# Patient Record
Sex: FEMALE | Employment: FULL TIME | URBAN - METROPOLITAN AREA
[De-identification: names, ages, dates, MRNs, and addresses within clinical notes are randomized per-mention and may not be internally consistent; named-entity substitution may affect disease eponyms.]

---

## 2017-04-27 ENCOUNTER — LAB ENCOUNTER (OUTPATIENT)
Dept: LAB | Facility: HOSPITAL | Age: 52
End: 2017-04-27
Attending: INTERNAL MEDICINE
Payer: COMMERCIAL

## 2017-04-27 DIAGNOSIS — B19.20 HEPATITIS C VIRUS INFECTION WITHOUT HEPATIC COMA, UNSPECIFIED CHRONICITY: ICD-10-CM

## 2017-04-27 PROCEDURE — 36415 COLL VENOUS BLD VENIPUNCTURE: CPT

## 2017-04-27 PROCEDURE — 85025 COMPLETE CBC W/AUTO DIFF WBC: CPT

## 2017-04-28 NOTE — PROGRESS NOTES
Quick Note:    Reviewed result. For some reason lab didn't run the CMP or HCV RNA quant - pt will have this drawn in a week or so. OV set for 5/24/17.   Pt verbalized understanding  ______

## 2017-05-04 ENCOUNTER — APPOINTMENT (OUTPATIENT)
Dept: LAB | Facility: HOSPITAL | Age: 52
End: 2017-05-04
Attending: INTERNAL MEDICINE
Payer: COMMERCIAL

## 2017-05-04 DIAGNOSIS — B19.20 HEPATITIS C VIRUS INFECTION WITHOUT HEPATIC COMA, UNSPECIFIED CHRONICITY: ICD-10-CM

## 2017-05-04 PROCEDURE — 80053 COMPREHEN METABOLIC PANEL: CPT

## 2017-05-04 PROCEDURE — 87522 HEPATITIS C REVRS TRNSCRPJ: CPT

## 2017-05-04 PROCEDURE — 36415 COLL VENOUS BLD VENIPUNCTURE: CPT

## 2017-05-10 NOTE — PROGRESS NOTES
Quick Note:    Pt instructed to call office back at [phone], press option #2 for a nurse to discuss results.   OV 5/24/17  ______

## 2017-05-11 NOTE — PROGRESS NOTES
Quick Note:    Left message to call office @ [phone] option #2, triage RN for test result.  ______

## 2017-05-25 ENCOUNTER — TELEPHONE (OUTPATIENT)
Dept: OBGYN CLINIC | Facility: CLINIC | Age: 52
End: 2017-05-25

## 2017-05-25 DIAGNOSIS — Z85.3 HX OF BREAST CANCER: Primary | ICD-10-CM

## 2017-06-01 ENCOUNTER — HOSPITAL ENCOUNTER (OUTPATIENT)
Dept: MAMMOGRAPHY | Facility: HOSPITAL | Age: 52
Discharge: HOME | End: 2017-06-01
Attending: OBSTETRICS & GYNECOLOGY
Payer: COMMERCIAL

## 2017-06-01 DIAGNOSIS — Z85.3 HX OF BREAST CANCER: ICD-10-CM

## 2017-06-01 PROCEDURE — 77066 DX MAMMO INCL CAD BI: CPT | Performed by: OBSTETRICS & GYNECOLOGY

## 2017-07-24 RX ORDER — TAMOXIFEN CITRATE 20 MG/1
TABLET ORAL
Qty: 90 TABLET | Refills: 3 | Status: SHIPPED | OUTPATIENT
Start: 2017-07-24 | End: 2017-07-24

## 2017-08-30 ENCOUNTER — OFFICE VISIT (OUTPATIENT)
Dept: HEMATOLOGY/ONCOLOGY | Facility: HOSPITAL | Age: 52
End: 2017-08-30
Attending: INTERNAL MEDICINE
Payer: COMMERCIAL

## 2017-08-30 VITALS
SYSTOLIC BLOOD PRESSURE: 131 MMHG | HEART RATE: 83 BPM | TEMPERATURE: 99 F | RESPIRATION RATE: 16 BRPM | DIASTOLIC BLOOD PRESSURE: 74 MMHG | HEIGHT: 64 IN

## 2017-08-30 DIAGNOSIS — F41.9 ANXIETY: ICD-10-CM

## 2017-08-30 DIAGNOSIS — C50.512 MALIGNANT NEOPLASM OF LOWER-OUTER QUADRANT OF LEFT BREAST OF FEMALE, ESTROGEN RECEPTOR POSITIVE (HCC): Primary | ICD-10-CM

## 2017-08-30 DIAGNOSIS — Z79.810 ENCOUNTER FOR MONITORING TAMOXIFEN THERAPY: ICD-10-CM

## 2017-08-30 DIAGNOSIS — Z51.81 ENCOUNTER FOR MONITORING TAMOXIFEN THERAPY: ICD-10-CM

## 2017-08-30 DIAGNOSIS — N95.1 MENOPAUSAL SYMPTOMS: ICD-10-CM

## 2017-08-30 DIAGNOSIS — Z17.0 MALIGNANT NEOPLASM OF LOWER-OUTER QUADRANT OF LEFT BREAST OF FEMALE, ESTROGEN RECEPTOR POSITIVE (HCC): Primary | ICD-10-CM

## 2017-08-30 PROCEDURE — 99214 OFFICE O/P EST MOD 30 MIN: CPT | Performed by: INTERNAL MEDICINE

## 2017-08-30 PROCEDURE — 99212 OFFICE O/P EST SF 10 MIN: CPT | Performed by: INTERNAL MEDICINE

## 2017-08-30 NOTE — PROGRESS NOTES
FALGUNI Ch is a 44year old female with a history of triple positive, pT1c Nmi M0 infiltrating ductal carcinoma of the left breast.  Her mammogram performed 6/1/2017 was benign. Patient has continued to take tamoxifen daily.   She does not ha 9/21/2012 Surgery PORTACATH REMOVED BY DR. Davina La          Review of Systems:     Review of Systems   Constitutional: Positive for fatigue. Negative for activity change, chills and fever.         Refuses weight - upset with weight gain   HENT: Positive for 2010:  lumpectomy    • Malignant neoplasm of lower-outer quadrant of left breast of female, estrogen receptor positive ([facility] [address] 1/13/2012   • Osteoporosis screening 4/25/2014    DEXA SCAN   • Ruptured appendix 1995    per NG: appendectomy     Past Surgical H Constitutional: She is oriented to person, place, and time. She appears well-developed. obese   HENT:   Head: Normocephalic and atraumatic. Mouth/Throat: No oropharyngeal exudate.    Eyes: Conjunctivae and EOM are normal. Pupils are equal, round, and re Patient is at risk for osteoporosis. She had a DEXA scan April 18, 2016. Will repeat the DEXA scan in 2 years.      Patient is encouraged to again lose weight with a healthy diet and increased exercise program.    Patient has been successfully treated for If there is no palpable abnormality, routine yearly followup is recommended.                            Dictated by (CST): Michelle Tanner MD on 6/01/2017 at 13:29       Approved by (CST): Michelle Griffin MD on 6/01/2017 at 13:3 0.0 - 1.0 K/UL  0.6   Eosinophils Absolute      0.0 - 0.7 K/UL  0.1   Basophils Absolute      0.0 - 0.2 K/UL  0.0   HCV RNA Qnt Real-Time PCR Log IU/ml      log IU <1.2    HCV Rna Qnt Real-Time PCR IU/ml      IU/mL <15    HCV RNA QNT PCR INTERPRETATION probability of a major osteoporosis-related fracture > 20% based on the  US-adapted WHO algorithm     CONCLUSION: Normal bone density. 3% decrease in bone density of left hip  since previous study. No significant change in bone density of lumbar  spine.

## 2017-11-10 ENCOUNTER — APPOINTMENT (OUTPATIENT)
Dept: LAB | Facility: HOSPITAL | Age: 52
End: 2017-11-10
Attending: INTERNAL MEDICINE
Payer: COMMERCIAL

## 2017-11-10 DIAGNOSIS — C50.512 MALIGNANT NEOPLASM OF LOWER-OUTER QUADRANT OF LEFT BREAST OF FEMALE, ESTROGEN RECEPTOR POSITIVE (HCC): ICD-10-CM

## 2017-11-10 DIAGNOSIS — Z51.81 ENCOUNTER FOR MONITORING TAMOXIFEN THERAPY: ICD-10-CM

## 2017-11-10 DIAGNOSIS — Z17.0 MALIGNANT NEOPLASM OF LOWER-OUTER QUADRANT OF LEFT BREAST OF FEMALE, ESTROGEN RECEPTOR POSITIVE (HCC): ICD-10-CM

## 2017-11-10 DIAGNOSIS — Z79.810 ENCOUNTER FOR MONITORING TAMOXIFEN THERAPY: ICD-10-CM

## 2017-11-10 DIAGNOSIS — N95.1 MENOPAUSAL SYMPTOMS: ICD-10-CM

## 2017-11-10 PROCEDURE — 83001 ASSAY OF GONADOTROPIN (FSH): CPT

## 2017-11-10 PROCEDURE — 82670 ASSAY OF TOTAL ESTRADIOL: CPT

## 2017-12-26 ENCOUNTER — APPOINTMENT (OUTPATIENT)
Dept: GENERAL RADIOLOGY | Age: 52
End: 2017-12-26
Attending: NURSE PRACTITIONER
Payer: COMMERCIAL

## 2017-12-26 ENCOUNTER — HOSPITAL ENCOUNTER (OUTPATIENT)
Age: 52
Discharge: HOME | End: 2017-12-26
Payer: COMMERCIAL

## 2017-12-26 VITALS
TEMPERATURE: 98 F | SYSTOLIC BLOOD PRESSURE: 127 MMHG | HEART RATE: 90 BPM | RESPIRATION RATE: 18 BRPM | HEIGHT: 64 IN | BODY MASS INDEX: 39.27 KG/M2 | DIASTOLIC BLOOD PRESSURE: 84 MMHG | WEIGHT: 230 LBS | OXYGEN SATURATION: 99 %

## 2017-12-26 DIAGNOSIS — M79.89 FINGER SWELLING: Primary | ICD-10-CM

## 2017-12-26 PROCEDURE — 73140 X-RAY EXAM OF FINGER(S): CPT | Performed by: NURSE PRACTITIONER

## 2017-12-26 PROCEDURE — 99213 OFFICE O/P EST LOW 20 MIN: CPT

## 2017-12-26 PROCEDURE — 99203 OFFICE O/P NEW LOW 30 MIN: CPT

## 2017-12-26 NOTE — ED PROVIDER NOTES
Patient presents with:  Upper Extremity Injury (musculoskeletal)      HPI:     Tres Smiley is a 45year old female who presents today with a chief complaint of swelling in the second digit of the L hand over the course of the last one month.  Pt rachel Comments:              swollen finger            Order Specific Question: What is the Relevant Clinical Indication / Reason for Exam?          Answer: pain/injury    Labs performed this visit:  No results found for this or any previous visit (from the past

## 2018-04-09 ENCOUNTER — TELEPHONE (OUTPATIENT)
Dept: HEMATOLOGY/ONCOLOGY | Facility: HOSPITAL | Age: 53
End: 2018-04-09

## 2018-04-09 NOTE — TELEPHONE ENCOUNTER
Vishal Carrion called and said she took some lab test in November that would determine if she was to stay on the Tamoxifen or not, she said she never heard back with the result, please call her at work today [phone].  Thanks

## 2018-04-10 ENCOUNTER — OFFICE VISIT (OUTPATIENT)
Dept: HEMATOLOGY/ONCOLOGY | Facility: HOSPITAL | Age: 53
End: 2018-04-10
Attending: INTERNAL MEDICINE
Payer: COMMERCIAL

## 2018-04-10 VITALS
SYSTOLIC BLOOD PRESSURE: 142 MMHG | RESPIRATION RATE: 18 BRPM | HEART RATE: 91 BPM | DIASTOLIC BLOOD PRESSURE: 77 MMHG | TEMPERATURE: 98 F

## 2018-04-10 DIAGNOSIS — Z79.811 ENCOUNTER FOR MONITORING AROMATASE INHIBITOR THERAPY: ICD-10-CM

## 2018-04-10 DIAGNOSIS — Z17.0 MALIGNANT NEOPLASM OF LOWER-OUTER QUADRANT OF LEFT BREAST OF FEMALE, ESTROGEN RECEPTOR POSITIVE (HCC): Primary | ICD-10-CM

## 2018-04-10 DIAGNOSIS — Z51.81 ENCOUNTER FOR MONITORING AROMATASE INHIBITOR THERAPY: ICD-10-CM

## 2018-04-10 DIAGNOSIS — Z79.810 ENCOUNTER FOR MONITORING TAMOXIFEN THERAPY: ICD-10-CM

## 2018-04-10 DIAGNOSIS — Z78.0 ASYMPTOMATIC MENOPAUSE: ICD-10-CM

## 2018-04-10 DIAGNOSIS — C50.912 HORMONE RECEPTOR POSITIVE BREAST CANCER, LEFT (HCC): ICD-10-CM

## 2018-04-10 DIAGNOSIS — F41.9 ANXIETY: ICD-10-CM

## 2018-04-10 DIAGNOSIS — Z51.81 ENCOUNTER FOR MONITORING TAMOXIFEN THERAPY: ICD-10-CM

## 2018-04-10 DIAGNOSIS — C50.512 MALIGNANT NEOPLASM OF LOWER-OUTER QUADRANT OF LEFT BREAST OF FEMALE, ESTROGEN RECEPTOR POSITIVE (HCC): Primary | ICD-10-CM

## 2018-04-10 PROCEDURE — 99214 OFFICE O/P EST MOD 30 MIN: CPT | Performed by: INTERNAL MEDICINE

## 2018-04-10 RX ORDER — ALPRAZOLAM 0.25 MG/1
0.25 TABLET ORAL 3 TIMES DAILY PRN
Qty: 50 TABLET | Refills: 1 | Status: SHIPPED | OUTPATIENT
Start: 2018-04-10 | End: 2019-02-25 | Stop reason: ALTCHOICE

## 2018-04-10 RX ORDER — ANASTROZOLE 1 MG/1
1 TABLET ORAL DAILY
Qty: 90 TABLET | Refills: 3 | Status: SHIPPED | OUTPATIENT
Start: 2018-04-10 | End: 2019-03-30

## 2018-04-10 NOTE — TELEPHONE ENCOUNTER
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

## 2018-04-10 NOTE — PROGRESS NOTES
HPI      Ma Score is a 45year old female with a history of triple positive, pT1c Nmi M0 infiltrating ductal carcinoma of the left breast.  Her mammogram performed 6/1/2017 was benign. Patient has continued to take tamoxifen daily.   She does not Constitutional: Positive for fatigue. Negative for activity change, chills and fever. Upset with weight gain   HENT: Negative for ear pain, mouth sores and sore throat. Eyes: Positive for visual disturbance.         Patient had Lasik procedures 2010:  lumpectomy    • Malignant neoplasm of lower-outer quadrant of left breast of female, estrogen receptor positive ([facility] [address] 1/13/2012   • Osteoporosis screening 4/25/2014    DEXA SCAN   • Ruptured appendix 1995    per NG: appendectomy     Past Surgical H Constitutional: She is oriented to person, place, and time. She appears well-developed. No distress. obese   HENT:   Head: Normocephalic and atraumatic. Mouth/Throat: No oropharyngeal exudate.    Eyes: Conjunctivae and EOM are normal. Pupils are equal, Lilia Monson does not have evidence of local recurrence nor metastatic triple positive pT1c N1mi M0 left breast cancer. She had a 90 day supply of tamoxifen during her previous office visit.   We discussed discontinuing tamoxifen and changing to an aromatase inhib BREAST COMPOSITION:                    CATEGORY b - There are scattered areas of fibroglandular density. FINDINGS:                  Digital images were obtained and were reviewed with the R2 JOAQUIM [de-identified] CAD device.                            No abnormal 10.0 - 20.0 23.3 (H)    CALCULATED OSMOLALITY      275 - 295 mOsm/kg 291    GFR, Non-      >=60 >60    GFR, -American      >=60 >60    WBC      4.0 - 11.0 K/UL  7.7   RBC      3.70 - 5.40 M/UL  4.34   Hemoglobin      12.0 - 16.0 to approximately 10% below peak normal bone density.     WORLD HEALTH ORGANIZATION CRITERIA  NORMAL T SCORE:      Above -1 s.d.  OSTEOPENIA T SCORE:  Between -1 and -2.5 s.d.  OSTEOPOROSIS T SCORE: -2.5 s.d.     [employer] Clinician's

## 2018-04-25 ENCOUNTER — LAB ENCOUNTER (OUTPATIENT)
Dept: LAB | Facility: HOSPITAL | Age: 53
End: 2018-04-25
Attending: INTERNAL MEDICINE
Payer: COMMERCIAL

## 2018-04-25 DIAGNOSIS — B19.20 HEPATITIS C VIRUS INFECTION WITHOUT HEPATIC COMA, UNSPECIFIED CHRONICITY: ICD-10-CM

## 2018-04-25 DIAGNOSIS — C50.912 HORMONE RECEPTOR POSITIVE BREAST CANCER, LEFT (HCC): ICD-10-CM

## 2018-04-25 PROCEDURE — 85025 COMPLETE CBC W/AUTO DIFF WBC: CPT

## 2018-04-25 PROCEDURE — 82248 BILIRUBIN DIRECT: CPT

## 2018-04-25 PROCEDURE — 80053 COMPREHEN METABOLIC PANEL: CPT

## 2018-04-25 PROCEDURE — 36415 COLL VENOUS BLD VENIPUNCTURE: CPT

## 2018-04-25 PROCEDURE — 87522 HEPATITIS C REVRS TRNSCRPJ: CPT

## 2018-04-30 NOTE — PROGRESS NOTES
Spoke with pt regarding test results and recommendations as noted per Dr. Esthela Regan. Pt agreed. Pt verbalized understanding.   NEXT OV 05/25/2018

## 2018-07-20 ENCOUNTER — HOSPITAL ENCOUNTER (OUTPATIENT)
Dept: MAMMOGRAPHY | Facility: HOSPITAL | Age: 53
Discharge: HOME | End: 2018-07-20
Attending: INTERNAL MEDICINE
Payer: COMMERCIAL

## 2018-07-20 ENCOUNTER — HOSPITAL ENCOUNTER (OUTPATIENT)
Dept: BONE DENSITY | Facility: HOSPITAL | Age: 53
Discharge: HOME | End: 2018-07-20
Attending: INTERNAL MEDICINE
Payer: COMMERCIAL

## 2018-07-20 DIAGNOSIS — Z79.811 ENCOUNTER FOR MONITORING AROMATASE INHIBITOR THERAPY: ICD-10-CM

## 2018-07-20 DIAGNOSIS — Z78.0 ASYMPTOMATIC MENOPAUSE: ICD-10-CM

## 2018-07-20 DIAGNOSIS — Z51.81 ENCOUNTER FOR MONITORING AROMATASE INHIBITOR THERAPY: ICD-10-CM

## 2018-07-20 DIAGNOSIS — C50.912 HORMONE RECEPTOR POSITIVE BREAST CANCER, LEFT (HCC): ICD-10-CM

## 2018-07-20 PROCEDURE — 77062 BREAST TOMOSYNTHESIS BI: CPT | Performed by: INTERNAL MEDICINE

## 2018-07-20 PROCEDURE — 77080 DXA BONE DENSITY AXIAL: CPT | Performed by: INTERNAL MEDICINE

## 2018-07-20 PROCEDURE — 77066 DX MAMMO INCL CAD BI: CPT | Performed by: INTERNAL MEDICINE

## 2018-08-12 ENCOUNTER — TELEPHONE (OUTPATIENT)
Dept: HEMATOLOGY/ONCOLOGY | Facility: HOSPITAL | Age: 53
End: 2018-08-12

## 2018-10-18 ENCOUNTER — TELEPHONE (OUTPATIENT)
Dept: HEMATOLOGY/ONCOLOGY | Facility: HOSPITAL | Age: 53
End: 2018-10-18

## 2018-10-18 NOTE — TELEPHONE ENCOUNTER
Debbie Anne would like to know if she needs orders for labs. Debbie Anne can be reached at [phone] once the orders are placed and would like to get scheduled for a follow up.  Please Advise

## 2018-10-19 ENCOUNTER — TELEPHONE (OUTPATIENT)
Dept: HEMATOLOGY/ONCOLOGY | Facility: HOSPITAL | Age: 53
End: 2018-10-19

## 2018-10-19 NOTE — TELEPHONE ENCOUNTER
Called Grayson Rinne to let her know she does need her 6 month follow up with . She said thanks for the information. She is at work now she will call back to schedule this. She said she is also getting a Primary doctor.  She does not need any labs for

## 2018-10-19 NOTE — TELEPHONE ENCOUNTER
Earbits was returning a call from Sharmila Shaw.  Earbits can be reached at [phone] Please Advise

## 2018-10-19 NOTE — TELEPHONE ENCOUNTER
Spoke with Mae Phillips- lt her know that per Dr. Jasson Smiley there is no need for labs per oncology standpoint-- she should have routine labs with her PCP. Mae Phillips verbalized understanding.

## 2019-02-25 ENCOUNTER — OFFICE VISIT (OUTPATIENT)
Dept: INTERNAL MEDICINE CLINIC | Facility: CLINIC | Age: 54
End: 2019-02-25
Payer: COMMERCIAL

## 2019-02-25 VITALS
HEART RATE: 90 BPM | DIASTOLIC BLOOD PRESSURE: 84 MMHG | WEIGHT: 215 LBS | SYSTOLIC BLOOD PRESSURE: 130 MMHG | RESPIRATION RATE: 18 BRPM | BODY MASS INDEX: 34.55 KG/M2 | TEMPERATURE: 98 F | OXYGEN SATURATION: 99 % | HEIGHT: 66 IN

## 2019-02-25 DIAGNOSIS — R20.0 NUMBNESS OF FINGERS: ICD-10-CM

## 2019-02-25 DIAGNOSIS — Z87.410 HISTORY OF CERVICAL DYSPLASIA: ICD-10-CM

## 2019-02-25 DIAGNOSIS — C50.512 MALIGNANT NEOPLASM OF LOWER-OUTER QUADRANT OF LEFT BREAST OF FEMALE, ESTROGEN RECEPTOR POSITIVE (HCC): ICD-10-CM

## 2019-02-25 DIAGNOSIS — E66.9 OBESITY (BMI 30.0-34.9): ICD-10-CM

## 2019-02-25 DIAGNOSIS — R92.2 DENSE BREAST TISSUE: ICD-10-CM

## 2019-02-25 DIAGNOSIS — L40.9 PSORIASIS: Primary | ICD-10-CM

## 2019-02-25 DIAGNOSIS — Z17.0 MALIGNANT NEOPLASM OF LOWER-OUTER QUADRANT OF LEFT BREAST OF FEMALE, ESTROGEN RECEPTOR POSITIVE (HCC): ICD-10-CM

## 2019-02-25 DIAGNOSIS — Z86.19 HISTORY OF HEPATITIS C: ICD-10-CM

## 2019-02-25 DIAGNOSIS — K80.20 GALLSTONES: ICD-10-CM

## 2019-02-25 PROCEDURE — 99204 OFFICE O/P NEW MOD 45 MIN: CPT | Performed by: INTERNAL MEDICINE

## 2019-02-25 RX ORDER — MOMETASONE FUROATE 1 MG/G
1 CREAM TOPICAL DAILY
Qty: 60 G | Refills: 0 | Status: SHIPPED | OUTPATIENT
Start: 2019-02-25 | End: 2020-03-04

## 2019-02-25 NOTE — PROGRESS NOTES
Lorrie Trent is a 52year old female.   Patient presents with:  Establish Care: New pt presents to clinic To establish care       HPI:         On Tamoxifen since 2010, now on  Anastrazole for breast cancer for the last 6-7 months since [facility] 1/13/2012   • Osteoporosis screening 4/25/2014    DEXA SCAN   • Ruptured appendix 1995    per NG: appendectomy      Past Surgical History:   Procedure Laterality Date   • APPENDECTOMY  1995    per NG: REMOVED PART OF LARGE BOWEL   • BREAST B or bleeding  PSYCH: depressed about hair    Physical Exam:   02/25/19  1430   BP: 130/84   Pulse: 90   Resp: 18   Temp: 97.9 °F (36.6 °C)   TempSrc: Oral   SpO2: 99%   Weight: 215 lb   Height: 66\"     Body mass index is 34.7 kg/m².   Patient is alert, orie mmol/L    BUN/CREA Ratio 17.1 10.0 - 20.0    Calculated Osmolality 291 275 - 295 mOsm/kg    GFR, Non- >60 >=60    GFR, -American >60 >=60    FASTING No    HEPATIC FUNCTION PANEL (7)   Result Value Ref Range    AST 25 15 - 41 U/L    A although not to be used right after [place]. .  I would like her to be reevaluated in 3-4 weeks.    (R20.0) Numbness of fingers  Plan: No numbness now. Sleeps with wrist hyper extended. Advised to note if little finger not involved.   Numbness resolves in t

## 2019-03-02 ENCOUNTER — LAB ENCOUNTER (OUTPATIENT)
Dept: LAB | Facility: HOSPITAL | Age: 54
End: 2019-03-02
Attending: INTERNAL MEDICINE
Payer: COMMERCIAL

## 2019-03-02 DIAGNOSIS — L40.9 PSORIASIS: ICD-10-CM

## 2019-03-02 DIAGNOSIS — R73.01 IMPAIRED FASTING BLOOD SUGAR: ICD-10-CM

## 2019-03-02 LAB
ALBUMIN SERPL-MCNC: 4 G/DL (ref 3.4–5)
ALBUMIN/GLOB SERPL: 1.1 {RATIO} (ref 1–2)
ALP LIVER SERPL-CCNC: 69 U/L (ref 41–108)
ALT SERPL-CCNC: 23 U/L (ref 13–56)
ANION GAP SERPL CALC-SCNC: 8 MMOL/L (ref 0–18)
AST SERPL-CCNC: 16 U/L (ref 15–37)
BACTERIA UR QL AUTO: NEGATIVE /HPF
BASOPHILS # BLD AUTO: 0.04 X10(3) UL (ref 0–0.2)
BASOPHILS NFR BLD AUTO: 0.8 %
BILIRUB SERPL-MCNC: 0.5 MG/DL (ref 0.1–2)
BILIRUB UR QL: NEGATIVE
BUN BLD-MCNC: 15 MG/DL (ref 7–18)
BUN/CREAT SERPL: 22.7 (ref 10–20)
CALCIUM BLD-MCNC: 9.7 MG/DL (ref 8.5–10.1)
CHLORIDE SERPL-SCNC: 109 MMOL/L (ref 98–107)
CHOLEST SMN-MCNC: 198 MG/DL (ref ?–200)
CLARITY UR: CLEAR
CO2 SERPL-SCNC: 24 MMOL/L (ref 21–32)
COLOR UR: YELLOW
CREAT BLD-MCNC: 0.66 MG/DL (ref 0.55–1.02)
DEPRECATED RDW RBC AUTO: 41 FL (ref 35.1–46.3)
EOSINOPHIL # BLD AUTO: 0.08 X10(3) UL (ref 0–0.7)
EOSINOPHIL NFR BLD AUTO: 1.7 %
ERYTHROCYTE [DISTWIDTH] IN BLOOD BY AUTOMATED COUNT: 12.6 % (ref 11–15)
GLOBULIN PLAS-MCNC: 3.5 G/DL (ref 2.8–4.4)
GLUCOSE BLD-MCNC: 136 MG/DL (ref 70–99)
GLUCOSE UR-MCNC: NEGATIVE MG/DL
HCT VFR BLD AUTO: 40.1 % (ref 35–48)
HDLC SERPL-MCNC: 46 MG/DL (ref 40–59)
HGB BLD-MCNC: 13.6 G/DL (ref 12–16)
HGB UR QL STRIP.AUTO: NEGATIVE
IMM GRANULOCYTES # BLD AUTO: 0.01 X10(3) UL (ref 0–1)
IMM GRANULOCYTES NFR BLD: 0.2 %
IRON SATURATION: 23 % (ref 15–50)
IRON SERPL-MCNC: 98 UG/DL (ref 50–170)
KETONES UR-MCNC: NEGATIVE MG/DL
LDLC SERPL CALC-MCNC: 130 MG/DL (ref ?–100)
LYMPHOCYTES # BLD AUTO: 1.58 X10(3) UL (ref 1–4)
LYMPHOCYTES NFR BLD AUTO: 33.1 %
M PROTEIN MFR SERPL ELPH: 7.5 G/DL (ref 6.4–8.2)
MCH RBC QN AUTO: 30.5 PG (ref 26–34)
MCHC RBC AUTO-ENTMCNC: 33.9 G/DL (ref 31–37)
MCV RBC AUTO: 89.9 FL (ref 80–100)
MONOCYTES # BLD AUTO: 0.37 X10(3) UL (ref 0.1–1)
MONOCYTES NFR BLD AUTO: 7.7 %
NEUTROPHILS # BLD AUTO: 2.7 X10 (3) UL (ref 1.5–7.7)
NEUTROPHILS # BLD AUTO: 2.7 X10(3) UL (ref 1.5–7.7)
NEUTROPHILS NFR BLD AUTO: 56.5 %
NITRITE UR QL STRIP.AUTO: NEGATIVE
NONHDLC SERPL-MCNC: 152 MG/DL (ref ?–130)
OSMOLALITY SERPL CALC.SUM OF ELEC: 295 MOSM/KG (ref 275–295)
PH UR: 6 [PH] (ref 5–8)
PLATELET # BLD AUTO: 204 10(3)UL (ref 150–450)
POTASSIUM SERPL-SCNC: 3.9 MMOL/L (ref 3.5–5.1)
PROT UR-MCNC: NEGATIVE MG/DL
RBC # BLD AUTO: 4.46 X10(6)UL (ref 3.8–5.3)
RBC #/AREA URNS AUTO: 1 /HPF
SODIUM SERPL-SCNC: 141 MMOL/L (ref 136–145)
SP GR UR STRIP: 1.02 (ref 1–1.03)
TOTAL IRON BINDING CAPACITY: 420 UG/DL (ref 240–450)
TRANSFERRIN SERPL-MCNC: 282 MG/DL (ref 200–360)
TRIGL SERPL-MCNC: 112 MG/DL (ref 30–149)
TSI SER-ACNC: 1.18 MIU/ML (ref 0.36–3.74)
UROBILINOGEN UR STRIP-ACNC: <2
VIT B12 SERPL-MCNC: 399 PG/ML (ref 193–986)
VIT C UR-MCNC: NEGATIVE MG/DL
VLDLC SERPL CALC-MCNC: 22 MG/DL (ref 0–30)
WBC # BLD AUTO: 4.8 X10(3) UL (ref 4–11)
WBC #/AREA URNS AUTO: 1 /HPF

## 2019-03-02 PROCEDURE — 83036 HEMOGLOBIN GLYCOSYLATED A1C: CPT

## 2019-03-02 PROCEDURE — 36415 COLL VENOUS BLD VENIPUNCTURE: CPT

## 2019-03-02 PROCEDURE — 80061 LIPID PANEL: CPT

## 2019-03-02 PROCEDURE — 84443 ASSAY THYROID STIM HORMONE: CPT

## 2019-03-02 PROCEDURE — 84466 ASSAY OF TRANSFERRIN: CPT

## 2019-03-02 PROCEDURE — 83540 ASSAY OF IRON: CPT

## 2019-03-02 PROCEDURE — 80053 COMPREHEN METABOLIC PANEL: CPT

## 2019-03-02 PROCEDURE — 82306 VITAMIN D 25 HYDROXY: CPT

## 2019-03-02 PROCEDURE — 85025 COMPLETE CBC W/AUTO DIFF WBC: CPT

## 2019-03-02 PROCEDURE — 86038 ANTINUCLEAR ANTIBODIES: CPT

## 2019-03-02 PROCEDURE — 81001 URINALYSIS AUTO W/SCOPE: CPT

## 2019-03-02 PROCEDURE — 82607 VITAMIN B-12: CPT

## 2019-03-04 LAB
25(OH)D3 SERPL-MCNC: 28.1 NG/ML (ref 30–100)
EST. AVERAGE GLUCOSE BLD GHB EST-MCNC: 131 MG/DL (ref 68–126)
HBA1C MFR BLD HPLC: 6.2 % (ref ?–5.7)
NUCLEAR IGG TITR SER IF: NEGATIVE {TITER}

## 2019-03-08 ENCOUNTER — TELEPHONE (OUTPATIENT)
Dept: INTERNAL MEDICINE CLINIC | Facility: CLINIC | Age: 54
End: 2019-03-08

## 2019-03-08 NOTE — TELEPHONE ENCOUNTER
----- Message from Remigio Post MD sent at 3/7/2019 10:21 PM CST -----  Please f/u appt within next 2 weeks if able to review labs  ----- Message -----  From: Pankaj Penaloza MD  Sent: 3/6/2019   5:41 PM  To:  Remigio Post MD    Called the

## 2019-03-13 ENCOUNTER — OFFICE VISIT (OUTPATIENT)
Dept: INTERNAL MEDICINE CLINIC | Facility: CLINIC | Age: 54
End: 2019-03-13
Payer: COMMERCIAL

## 2019-03-13 VITALS
TEMPERATURE: 98 F | HEART RATE: 81 BPM | HEIGHT: 66 IN | WEIGHT: 217 LBS | BODY MASS INDEX: 34.87 KG/M2 | OXYGEN SATURATION: 98 % | RESPIRATION RATE: 18 BRPM | SYSTOLIC BLOOD PRESSURE: 124 MMHG | DIASTOLIC BLOOD PRESSURE: 86 MMHG

## 2019-03-13 DIAGNOSIS — E66.01 MORBID OBESITY (HCC): ICD-10-CM

## 2019-03-13 DIAGNOSIS — R20.0 NUMBNESS OF FINGERS: ICD-10-CM

## 2019-03-13 DIAGNOSIS — L40.9 PSORIASIS: Primary | ICD-10-CM

## 2019-03-13 DIAGNOSIS — E78.00 PURE HYPERCHOLESTEROLEMIA: ICD-10-CM

## 2019-03-13 DIAGNOSIS — R73.9 HYPERGLYCEMIA: ICD-10-CM

## 2019-03-13 PROCEDURE — 99214 OFFICE O/P EST MOD 30 MIN: CPT | Performed by: INTERNAL MEDICINE

## 2019-03-13 NOTE — PROGRESS NOTES
Juanita Chow is a 52year old female. Patient presents with:  Test Results: Pt presents to clinic for follow up on test results       HPI:         Psoriasis better using creams.   She is able to not scratch as much, has no plaques of scalp any sri LARGE BOWEL   • BREAST BIOPSY Left 4/26/2010    US guided biopsy left breast   • CHEMOTHERAPY     • COLON SURGERY      14 inch of lower intestine   • HYSTERECTOMY     • LEEP  2002   • LUMPECTOMY LEFT Left     UOQ   • LUMPECTOMY LEFT Left 5/7/2010    UOQ regular. Extremities-no edema  Skin-psoriasis much improved, without plaques on scalp. Mild area of erythema at left frontal hairline. Elbows also improved with slight psoriasis papules, though much improved from prior.       Objectives:  Results for ord Clarity Urine Clear Clear    Spec Gravity 1.019 1.002 - 1.035    pH Urine 6.0 5.0 - 8.0    Protein Urine Negative Negative mg/dL    Glucose Urine Negative Negative mg/dL    Ketones Urine Negative Negative mg/dL    Bilirubin Urine Negative Negative    Blood She does not wish to see a dietitian at this time. Discussed diet to raise HDL and lower LDL    (R73.9) Hyperglycemia  Plan: Discussed at length that she has hyperglycemia/prediabetes, and that she is at risk for diabetes.   Weight loss should cause improv

## 2019-03-13 NOTE — PATIENT INSTRUCTIONS
Nature Made Vitamin D3 capsules, 1000 IU daily  Vitamin B12 sublingual, 1000 mcg daily  Avoid prenatal vitamins if they contain iron  Low glycemic foods including Ezechial bread , such as flax

## 2019-03-30 DIAGNOSIS — C50.912 HORMONE RECEPTOR POSITIVE BREAST CANCER, LEFT (HCC): ICD-10-CM

## 2019-04-02 RX ORDER — ANASTROZOLE 1 MG/1
1 TABLET ORAL DAILY
Qty: 90 TABLET | Refills: 3 | Status: SHIPPED | OUTPATIENT
Start: 2019-04-02 | End: 2020-03-09

## 2019-05-09 ENCOUNTER — HOSPITAL ENCOUNTER (OUTPATIENT)
Dept: MAMMOGRAPHY | Facility: HOSPITAL | Age: 54
Discharge: HOME | End: 2019-05-09
Attending: INTERNAL MEDICINE
Payer: COMMERCIAL

## 2019-05-09 DIAGNOSIS — Z17.0 MALIGNANT NEOPLASM OF LOWER-OUTER QUADRANT OF LEFT BREAST OF FEMALE, ESTROGEN RECEPTOR POSITIVE (HCC): ICD-10-CM

## 2019-05-09 DIAGNOSIS — C50.512 MALIGNANT NEOPLASM OF LOWER-OUTER QUADRANT OF LEFT BREAST OF FEMALE, ESTROGEN RECEPTOR POSITIVE (HCC): ICD-10-CM

## 2019-05-09 PROCEDURE — 77066 DX MAMMO INCL CAD BI: CPT | Performed by: INTERNAL MEDICINE

## 2019-05-09 PROCEDURE — 77062 BREAST TOMOSYNTHESIS BI: CPT | Performed by: INTERNAL MEDICINE

## 2019-10-25 ENCOUNTER — TELEPHONE (OUTPATIENT)
Dept: HEMATOLOGY/ONCOLOGY | Facility: HOSPITAL | Age: 54
End: 2019-10-25

## 2019-10-25 NOTE — TELEPHONE ENCOUNTER
Lynna Duverney called wondering if Dr. Sam Lux had received or looked at the results of the blood work she had done in March, and if the results are okay. She would rather not come in for an appointment if the results are fine. Please advise.

## 2019-10-30 ENCOUNTER — TELEPHONE (OUTPATIENT)
Dept: HEMATOLOGY/ONCOLOGY | Facility: HOSPITAL | Age: 54
End: 2019-10-30

## 2019-11-07 ENCOUNTER — TELEPHONE (OUTPATIENT)
Dept: HEMATOLOGY/ONCOLOGY | Facility: HOSPITAL | Age: 54
End: 2019-11-07

## 2019-11-07 NOTE — TELEPHONE ENCOUNTER
Again returning pt's call--her labs were drawn by her PCP. She will need to review the results with her PCP.  With her history of breast cancer she will need to f/u yearly with Dr. Fanny Garrett if she wishes to continue to have her order mammos and anastrozole re

## 2019-11-07 NOTE — TELEPHONE ENCOUNTER
States not heard back from anyone regarding labs from March and does she need to see Dr Lynnetta Favre this year>

## 2020-03-04 ENCOUNTER — OFFICE VISIT (OUTPATIENT)
Dept: INTERNAL MEDICINE CLINIC | Facility: CLINIC | Age: 55
End: 2020-03-04
Payer: COMMERCIAL

## 2020-03-04 VITALS
WEIGHT: 210 LBS | DIASTOLIC BLOOD PRESSURE: 84 MMHG | BODY MASS INDEX: 34.57 KG/M2 | TEMPERATURE: 98 F | HEART RATE: 87 BPM | HEIGHT: 65.5 IN | OXYGEN SATURATION: 98 % | RESPIRATION RATE: 20 BRPM | SYSTOLIC BLOOD PRESSURE: 114 MMHG

## 2020-03-04 DIAGNOSIS — R92.2 DENSE BREAST TISSUE: ICD-10-CM

## 2020-03-04 DIAGNOSIS — E66.9 OBESITY (BMI 30.0-34.9): ICD-10-CM

## 2020-03-04 DIAGNOSIS — Z00.00 ROUTINE GENERAL MEDICAL EXAMINATION AT A HEALTH CARE FACILITY: Primary | ICD-10-CM

## 2020-03-04 DIAGNOSIS — R59.9 SWOLLEN GLAND: ICD-10-CM

## 2020-03-04 DIAGNOSIS — T78.40XA ALLERGIC STATE, INITIAL ENCOUNTER: ICD-10-CM

## 2020-03-04 DIAGNOSIS — E78.00 PURE HYPERCHOLESTEROLEMIA: ICD-10-CM

## 2020-03-04 DIAGNOSIS — B19.20 HEPATITIS C VIRUS INFECTION WITHOUT HEPATIC COMA, UNSPECIFIED CHRONICITY: ICD-10-CM

## 2020-03-04 DIAGNOSIS — R73.9 HYPERGLYCEMIA: ICD-10-CM

## 2020-03-04 DIAGNOSIS — C50.512 MALIGNANT NEOPLASM OF LOWER-OUTER QUADRANT OF LEFT BREAST OF FEMALE, ESTROGEN RECEPTOR POSITIVE (HCC): ICD-10-CM

## 2020-03-04 DIAGNOSIS — Z17.0 MALIGNANT NEOPLASM OF LOWER-OUTER QUADRANT OF LEFT BREAST OF FEMALE, ESTROGEN RECEPTOR POSITIVE (HCC): ICD-10-CM

## 2020-03-04 PROCEDURE — 99396 PREV VISIT EST AGE 40-64: CPT | Performed by: INTERNAL MEDICINE

## 2020-03-04 PROCEDURE — 36415 COLL VENOUS BLD VENIPUNCTURE: CPT | Performed by: INTERNAL MEDICINE

## 2020-03-04 NOTE — PROGRESS NOTES
Pt presented to clinic today for blood draw. Per physician able to draw orders. Orders  documented within chart. Pt tolerated lab draw well.  verified.   Orders drawn include: cbc, cmp, folic, F2N, vit d, vit b12, tsh, sed rate, lipid  Site of draw: rt miriam

## 2020-03-04 NOTE — PROGRESS NOTES
Kaushik Talley is a 62year old female. Patient presents with:  Physical: pt in for CPX. needs order for mammo, prevnar 13,       HPI:           Hurts to swallow, when chews, left ear hurts. Thinks from allergies, does not feel ill.   Has not follow inch of lower intestine   • HYSTERECTOMY     • LEEP  2002   • LUMPECTOMY LEFT Left     UOQ   • LUMPECTOMY LEFT Left 5/7/2010    UOQ   • LUMPECTOMY LEFT Left 5/18/2010    left breast re-excision and left axillary node dissection   • LYSIS OF ADHESIONS  12/2 distress  HEENT-pupils equal round reactive to light and accommodation, extraocular muscles intact. Conjunctive pink, sclerae anicteric  Neck-supple, no carotid bruits, no adenopathy.   Thyroid normal.  Submandibular adenopathy approximately 1 cm, tender, - 986 pg/mL   LIPID PANEL   Result Value Ref Range    Cholesterol, Total 198 <200 mg/dL    HDL Cholesterol 46 40 - 59 mg/dL    Triglycerides 112 30 - 149 mg/dL    LDL Cholesterol 130 (H) <100 mg/dL    VLDL 22 0 - 30 mg/dL    Non HDL Chol 152 (H) <130 mg/dL Neutrophil % 56.5 %    Lymphocyte % 33.1 %    Monocyte % 7.7 %    Eosinophil % 1.7 %    Basophil % 0.8 %    Immature Granulocyte % 0.2 %            Assessment/Plan:    (Z00.00) Routine general medical examination at a health care facility  (primary enco External Cream Apply 1 Tube topically daily.  60 g 0        History:  Past Medical History:   Diagnosis Date   • Anxiety 4/10/2018   • Breast cancer [facility])     left 2010   • Breast cancer of lower-outer quadrant of left female breast ([facility] [address] 1/13/2012   • [employer] brain   • Heart Disorder Mother    • Cancer Maternal Grandfather    • Cancer Paternal Grandfather    • Breast Cancer Self 50        left      Social History    Tobacco Use      Smoking status: Never Smoker      Smokeless tobacco: Never Used    [place] Sodium 141 136 - 145 mmol/L    Potassium 3.9 3.5 - 5.1 mmol/L    Chloride 109 (H) 98 - 107 mmol/L    CO2 24.0 21.0 - 32.0 mmol/L    Anion Gap 8 0 - 18 mmol/L    BUN 15 7 - 18 mg/dL    Creatinine 0.66 0.55 - 1.02 mg/dL    BUN/CREA Ratio 22.7 (H) 10.0 - 20. 0 Urine 1 0 - 5 /HPF    RBC URINE 1 0 - 3 /HPF    Bacteria Urine Negative None Seen /HPF   HEMOGLOBIN A1C   Result Value Ref Range    HgbA1C 6.2 (H) <5.7 %    Estimated Average Glucose 131 (H) 68 - 126 mg/dL   CBC W/ DIFFERENTIAL   Result Value Ref Range

## 2020-03-05 LAB
ABSOLUTE BASOPHILS: 52 CELLS/UL (ref 0–200)
ABSOLUTE EOSINOPHILS: 59 CELLS/UL (ref 15–500)
ABSOLUTE LYMPHOCYTES: 1859 CELLS/UL (ref 850–3900)
ABSOLUTE MONOCYTES: 416 CELLS/UL (ref 200–950)
ABSOLUTE NEUTROPHILS: 4115 CELLS/UL (ref 1500–7800)
ALBUMIN/GLOBULIN RATIO: 2 (CALC) (ref 1–2.5)
ALBUMIN: 4.7 G/DL (ref 3.6–5.1)
ALKALINE PHOSPHATASE: 82 U/L (ref 37–153)
ALT: 16 U/L (ref 6–29)
AST: 17 U/L (ref 10–35)
BASOPHILS: 0.8 %
BILIRUBIN, TOTAL: 0.6 MG/DL (ref 0.2–1.2)
BUN: 13 MG/DL (ref 7–25)
CALCIUM: 10.8 MG/DL (ref 8.6–10.4)
CARBON DIOXIDE: 25 MMOL/L (ref 20–32)
CHLORIDE: 104 MMOL/L (ref 98–110)
CHOL/HDLC RATIO: 4.6 (CALC)
CHOLESTEROL, TOTAL: 205 MG/DL
CREATININE: 0.76 MG/DL (ref 0.5–1.05)
EGFR IF AFRICN AM: 102 ML/MIN/1.73M2
EGFR IF NONAFRICN AM: 88 ML/MIN/1.73M2
EOSINOPHILS: 0.9 %
FOLATE, SERUM: >24 NG/ML
GLOBULIN: 2.4 G/DL (CALC) (ref 1.9–3.7)
GLUCOSE: 119 MG/DL (ref 65–99)
HDL CHOLESTEROL: 45 MG/DL
HEMATOCRIT: 41.6 % (ref 35–45)
HEMOGLOBIN A1C: 6.6 % OF TOTAL HGB
HEMOGLOBIN: 14.3 G/DL (ref 11.7–15.5)
LDL-CHOLESTEROL: 133 MG/DL (CALC)
LYMPHOCYTES: 28.6 %
MCH: 31 PG (ref 27–33)
MCHC: 34.4 G/DL (ref 32–36)
MCV: 90.2 FL (ref 80–100)
MONOCYTES: 6.4 %
MPV: 11.1 FL (ref 7.5–12.5)
NEUTROPHILS: 63.3 %
NON-HDL CHOLESTEROL: 160 MG/DL (CALC)
PLATELET COUNT: 234 THOUSAND/UL (ref 140–400)
POTASSIUM: 4.3 MMOL/L (ref 3.5–5.3)
PROTEIN, TOTAL: 7.1 G/DL (ref 6.1–8.1)
RDW: 13 % (ref 11–15)
RED BLOOD CELL COUNT: 4.61 MILLION/UL (ref 3.8–5.1)
SED RATE BY MODIFIED$WESTERGREN: 6 MM/H
SODIUM: 140 MMOL/L (ref 135–146)
TRIGLYCERIDES: 144 MG/DL
TSH W/REFLEX TO FT4: 0.94 MIU/L (ref 0.4–4.5)
VITAMIN B12: 595 PG/ML (ref 200–1100)
VITAMIN D, 25-OH, TOTAL: 29 NG/ML (ref 30–100)
WHITE BLOOD CELL COUNT: 6.5 THOUSAND/UL (ref 3.8–10.8)

## 2020-03-09 ENCOUNTER — OFFICE VISIT (OUTPATIENT)
Dept: INTERNAL MEDICINE CLINIC | Facility: CLINIC | Age: 55
End: 2020-03-09
Payer: COMMERCIAL

## 2020-03-09 DIAGNOSIS — R92.2 DENSE BREAST TISSUE: ICD-10-CM

## 2020-03-09 DIAGNOSIS — E83.52 HYPERCALCEMIA: Primary | ICD-10-CM

## 2020-03-09 DIAGNOSIS — K80.20 GALLSTONES: ICD-10-CM

## 2020-03-09 DIAGNOSIS — Z86.19 HISTORY OF HEPATITIS C: ICD-10-CM

## 2020-03-09 DIAGNOSIS — E55.9 VITAMIN D DEFICIENCY: ICD-10-CM

## 2020-03-09 DIAGNOSIS — Z87.410 HISTORY OF CERVICAL DYSPLASIA: ICD-10-CM

## 2020-03-09 DIAGNOSIS — R73.9 HYPERGLYCEMIA: ICD-10-CM

## 2020-03-09 DIAGNOSIS — E78.2 MIXED HYPERLIPIDEMIA: ICD-10-CM

## 2020-03-09 DIAGNOSIS — E66.9 OBESITY (BMI 30.0-34.9): ICD-10-CM

## 2020-03-09 DIAGNOSIS — C50.912 HORMONE RECEPTOR POSITIVE BREAST CANCER, LEFT (HCC): ICD-10-CM

## 2020-03-09 PROCEDURE — 99213 OFFICE O/P EST LOW 20 MIN: CPT | Performed by: INTERNAL MEDICINE

## 2020-03-09 RX ORDER — ANASTROZOLE 1 MG/1
1 TABLET ORAL DAILY
Qty: 90 TABLET | Refills: 3 | Status: SHIPPED | OUTPATIENT
Start: 2020-03-09 | End: 2020-03-09

## 2020-03-09 RX ORDER — ANASTROZOLE 1 MG/1
1 TABLET ORAL DAILY
Qty: 90 TABLET | Refills: 0 | Status: SHIPPED | OUTPATIENT
Start: 2020-03-09 | End: 2020-11-18

## 2020-03-09 NOTE — PROGRESS NOTES
Bentley He is a 62year old female. Patient presents with:  Test Results: pt in for follow up on test results       HPI:         Weight watchers on and off. Now less sweets than prior. Started taking calcium last 2 months.     Allergies:   No left breast re-excision and left axillary node dissection   • LYSIS OF ADHESIONS  2002    per NG: CHRISTIN /JL   •       per N week 6 lb(s) 12 oz Female   • RADIATION LEFT     • TOTAL ABDOMINAL HYSTERECTOMY  2002    per NG: CHRISTIN /JL   • US Extremities-no cyanosis clubbing or edema    Objectives:  Results for orders placed or performed in visit on 03/04/20   CBC WITH DIFFERENTIAL WITH PLATELET   Result Value Ref Range    WHITE BLOOD CELL COUNT 6.5 3.8 - 10.8 Thousand/uL    RED BLOOD CELL COUN VITAMIN B12 595 200 - 1,100 pg/mL   TSH W REFLEX TO FREE T4   Result Value Ref Range    TSH W/REFLEX TO FT4 0.94 0.40 - 4.50 mIU/L   SED RATE, WESTROSEMARYREN (AUTOMATED)   Result Value Ref Range    SED RATE BY MODIFIED$FINESSE 6 < OR = 30 mm/h   LIPID PANE Vitamin D 1000 IU 3 times weekly could be added for low Vitamin D  Diabetes with High Blood Sugar  You have been treated for high blood sugar (hyperglycemia). This may be because of an infection or other illness.  Or it may be from eating too many sweets or · Dizziness, lightheadedness, or loss of consciousness  · Shortness of breath  · Chest pain  · Weakness of an arm, leg, or one side of the face  · Sudden trouble with speech or vision  Date Last Reviewed: 9/1/2018  © 3863-1446 [employer]. 80 · If the value is high, take your diabetes medicines as prescribed. And check your blood sugar more often. Doses of medicines such as insulin can be increased slightly if blood sugars stay high. But your provider must approve this.   Preventing high blood s Your blood sugar is high today. This is called hyperglycemia. It means there is too much glucose (sugar) in your blood. This can have several possible causes. These include taking certain medicines, being under stress, or having an infection.  The most comm · Nausea or vomiting  · Itchy, dry skin  · Dizziness  · Confusion  · Abdominal pain  · Fruity-smelling breath  · Deep or rapid breathing  Date Last Reviewed: 6/1/2016  © 8891-7307 [employer]. [address] [facility], [address] [place] [address].  All ri Exercise can be fun. Choose an activity you enjoy.  You may even get a friend to do it with you:  · Take a resistance-training or aerobics class  · Join a team sport  · Take a dance class  · Walk the dog  · Ride a bike  If you have health problems, be sure As you start to eat more fiber, be sure to drink plenty of water to keep your digestive system working smoothly. Tips  Do's and don'ts include:   · Don’t skip meals. This often leads to overeating later on.  It’s best to spread your eating throughout the d · Make daily entries in your diary or journal about your activity and eating. A visual reminder of success, like a gold star, can help keep you going. · Every week, take time to look back on how much you’ve accomplished, and the changes you may have made.

## 2020-03-09 NOTE — PATIENT INSTRUCTIONS
Fasting  Blood sugar 119-should be 99 or lower  Hemoglobin A1C is 6.6 (average blood sugar over 3 months) =-should be less than 5.7  Do not take extra calcium  Vitamin D 1000 IU 3 times weekly could be added for low Vitamin D  Diabetes with High Blood Richards Ramirez reach your healthcare provider, go to a hospital emergency room or urgent care center.   Call 911  Call 911 if you have any of the following:  · Confusion  · Dizziness, lightheadedness, or loss of consciousness  · Shortness of breath  · Chest pain  · Christyne Pleas blood or urine for ketones as directed. · Call your provider if your blood sugar and ketones don't go back to your target range. · If the value is high, take your diabetes medicines as prescribed. And check your blood sugar more often.  Doses of medicines have several possible causes. These include taking certain medicines, being under stress, or having an infection. The most common cause is diabetes.  Diabetes develops when your body doesn't make enough insulin, which is a hormone that helps control blood s breathing  Date Last Reviewed: 6/1/2016  © 8260-9124 [employer]. [address] [facility] [place]. All rights reserved. This information is not intended as a substitute for professional medical care.  Always follow your healthcare pr a bike  If you have health problems, be sure to ask your healthcare provider before you start an exercise program. Have a  help you develop a plan that’s safe for you.    Date Last Reviewed: 4/1/2018  © 8339-2054 [employer] throughout the day. · Eat a variety of foods, not just a few favorites. · If you find yourself eating when you’re not hungry, ask yourself why. Many of us eat when we’re bored, stressed, or just to be polite. Listen to your body.  If you’re not hungry, ge You might try a low-fat cooking or yoga class. · Don’t be hard on yourself or give up if you slip. Be patient. Learn from your mistakes and adjust your plan if you need to. Then get right back to it. · Be realistic about your goals.  Talk with a dietitian

## 2020-03-12 VITALS
BODY MASS INDEX: 34.57 KG/M2 | TEMPERATURE: 98 F | OXYGEN SATURATION: 99 % | WEIGHT: 210 LBS | SYSTOLIC BLOOD PRESSURE: 134 MMHG | RESPIRATION RATE: 19 BRPM | HEART RATE: 95 BPM | HEIGHT: 65.5 IN | DIASTOLIC BLOOD PRESSURE: 80 MMHG

## 2020-03-23 PROBLEM — E55.9 VITAMIN D DEFICIENCY: Status: ACTIVE | Noted: 2020-03-23

## 2020-05-06 ENCOUNTER — TELEPHONE (OUTPATIENT)
Dept: INTERNAL MEDICINE CLINIC | Facility: CLINIC | Age: 55
End: 2020-05-06

## 2020-05-06 NOTE — TELEPHONE ENCOUNTER
Patient informed to report to urgent care for further evaluation, given the increased pain and inability to open mouth. MD was notified and agrees to plan. Patient verbalized understanding and will report to [facility] care after work.

## 2020-05-06 NOTE — TELEPHONE ENCOUNTER
Patient is c/o ear pain,face,and unable to open mouth when eating. The pain is all on her left side of her face. She does have an ultrasound maurisio for 5/12 for her neck. Dr. Kraig Dang is concerned patient might have a lump on her left side of neck.  Patient I c

## 2020-05-12 ENCOUNTER — APPOINTMENT (OUTPATIENT)
Dept: LAB | Facility: HOSPITAL | Age: 55
End: 2020-05-12
Attending: INTERNAL MEDICINE
Payer: COMMERCIAL

## 2020-05-12 ENCOUNTER — HOSPITAL ENCOUNTER (OUTPATIENT)
Dept: MAMMOGRAPHY | Facility: HOSPITAL | Age: 55
Discharge: HOME | End: 2020-05-12
Attending: INTERNAL MEDICINE
Payer: COMMERCIAL

## 2020-05-12 ENCOUNTER — HOSPITAL ENCOUNTER (OUTPATIENT)
Dept: ULTRASOUND IMAGING | Facility: HOSPITAL | Age: 55
Discharge: HOME | End: 2020-05-12
Attending: INTERNAL MEDICINE
Payer: COMMERCIAL

## 2020-05-12 DIAGNOSIS — C50.919 MALIGNANT NEOPLASM OF FEMALE BREAST, UNSPECIFIED ESTROGEN RECEPTOR STATUS, UNSPECIFIED LATERALITY, UNSPECIFIED SITE OF BREAST (HCC): ICD-10-CM

## 2020-05-12 DIAGNOSIS — R59.9 SWOLLEN GLAND: ICD-10-CM

## 2020-05-12 DIAGNOSIS — T78.40XA ALLERGIC STATE, INITIAL ENCOUNTER: ICD-10-CM

## 2020-05-12 DIAGNOSIS — Z17.0 MALIGNANT NEOPLASM OF LOWER-OUTER QUADRANT OF LEFT BREAST OF FEMALE, ESTROGEN RECEPTOR POSITIVE (HCC): ICD-10-CM

## 2020-05-12 DIAGNOSIS — C50.512 MALIGNANT NEOPLASM OF LOWER-OUTER QUADRANT OF LEFT BREAST OF FEMALE, ESTROGEN RECEPTOR POSITIVE (HCC): ICD-10-CM

## 2020-05-12 PROCEDURE — 86300 IMMUNOASSAY TUMOR CA 15-3: CPT

## 2020-05-12 PROCEDURE — 77066 DX MAMMO INCL CAD BI: CPT | Performed by: INTERNAL MEDICINE

## 2020-05-12 PROCEDURE — 77062 BREAST TOMOSYNTHESIS BI: CPT | Performed by: INTERNAL MEDICINE

## 2020-05-12 PROCEDURE — 76536 US EXAM OF HEAD AND NECK: CPT | Performed by: INTERNAL MEDICINE

## 2020-05-12 PROCEDURE — 36415 COLL VENOUS BLD VENIPUNCTURE: CPT

## 2020-05-14 ENCOUNTER — TELEPHONE (OUTPATIENT)
Dept: INTERNAL MEDICINE CLINIC | Facility: CLINIC | Age: 55
End: 2020-05-14

## 2020-05-14 DIAGNOSIS — E83.52 HYPERCALCEMIA: ICD-10-CM

## 2020-05-14 DIAGNOSIS — Z85.3 HISTORY OF BREAST CANCER: ICD-10-CM

## 2020-05-14 DIAGNOSIS — R59.1 LYMPHADENOPATHY OF HEAD AND NECK: Primary | ICD-10-CM

## 2020-05-14 NOTE — TELEPHONE ENCOUNTER
Patient is calling for US results and BW results. Patient, left detailed message about results.   Advised regarding abnormal neck ultrasound, and advised follow-up with CT of neck with contrast.  Advised regarding elevated blood sugar, and also elevat

## 2020-05-20 ENCOUNTER — VIRTUAL PHONE E/M (OUTPATIENT)
Dept: INTERNAL MEDICINE CLINIC | Facility: CLINIC | Age: 55
End: 2020-05-20

## 2020-05-20 DIAGNOSIS — E66.01 MORBID OBESITY (HCC): ICD-10-CM

## 2020-05-20 DIAGNOSIS — E78.2 MIXED HYPERLIPIDEMIA: ICD-10-CM

## 2020-05-20 DIAGNOSIS — E55.9 VITAMIN D DEFICIENCY: ICD-10-CM

## 2020-05-20 DIAGNOSIS — C50.912 HORMONE RECEPTOR POSITIVE BREAST CANCER, LEFT (HCC): ICD-10-CM

## 2020-05-20 DIAGNOSIS — C50.512 MALIGNANT NEOPLASM OF LOWER-OUTER QUADRANT OF LEFT BREAST OF FEMALE, ESTROGEN RECEPTOR POSITIVE (HCC): ICD-10-CM

## 2020-05-20 DIAGNOSIS — Z17.0 MALIGNANT NEOPLASM OF LOWER-OUTER QUADRANT OF LEFT BREAST OF FEMALE, ESTROGEN RECEPTOR POSITIVE (HCC): ICD-10-CM

## 2020-05-20 DIAGNOSIS — E11.65 CONTROLLED TYPE 2 DIABETES MELLITUS WITH HYPERGLYCEMIA, WITHOUT LONG-TERM CURRENT USE OF INSULIN (HCC): ICD-10-CM

## 2020-05-20 DIAGNOSIS — R59.1 LYMPHADENOPATHY OF HEAD AND NECK: Primary | ICD-10-CM

## 2020-05-20 DIAGNOSIS — L40.9 PSORIASIS OF SCALP: ICD-10-CM

## 2020-05-20 DIAGNOSIS — E83.52 HYPERCALCEMIA: ICD-10-CM

## 2020-05-20 PROCEDURE — 99214 OFFICE O/P EST MOD 30 MIN: CPT | Performed by: INTERNAL MEDICINE

## 2020-05-20 RX ORDER — CALCIPOTRIENE 50 UG/G
AEROSOL, FOAM TOPICAL
Qty: 1 CAN | Refills: 0 | Status: SHIPPED | OUTPATIENT
Start: 2020-05-20 | End: 2021-06-11

## 2020-05-20 RX ORDER — METFORMIN HYDROCHLORIDE 500 MG/1
500 TABLET, EXTENDED RELEASE ORAL DAILY
Qty: 30 TABLET | Refills: 0 | Status: SHIPPED | OUTPATIENT
Start: 2020-05-20 | End: 2020-06-12

## 2020-05-20 NOTE — PROGRESS NOTES
Virtual Telephone Check-In    Zulma Nunez verbally consents to a Virtual/Telephone Check-In visit on 05/20/20. Patient has been referred to the [facility] website at [email or web address] to review the yearly Consent to Treat document.     Patient und Prescription Donovex foam    Recheck labs, follow-up 1 month  Victor M Sargent MD

## 2020-06-12 DIAGNOSIS — E11.65 CONTROLLED TYPE 2 DIABETES MELLITUS WITH HYPERGLYCEMIA, WITHOUT LONG-TERM CURRENT USE OF INSULIN (HCC): Primary | ICD-10-CM

## 2020-06-12 RX ORDER — METFORMIN HYDROCHLORIDE 500 MG/1
TABLET, EXTENDED RELEASE ORAL
Qty: 30 TABLET | Refills: 0 | Status: SHIPPED | OUTPATIENT
Start: 2020-06-12 | End: 2020-07-06

## 2020-06-30 ENCOUNTER — HOSPITAL ENCOUNTER (OUTPATIENT)
Dept: CT IMAGING | Facility: HOSPITAL | Age: 55
Discharge: HOME | End: 2020-06-30
Attending: INTERNAL MEDICINE
Payer: COMMERCIAL

## 2020-06-30 DIAGNOSIS — R59.1 LYMPHADENOPATHY OF HEAD AND NECK: ICD-10-CM

## 2020-06-30 DIAGNOSIS — E83.52 HYPERCALCEMIA: ICD-10-CM

## 2020-06-30 DIAGNOSIS — Z85.3 HISTORY OF BREAST CANCER: ICD-10-CM

## 2020-06-30 PROCEDURE — 70491 CT SOFT TISSUE NECK W/DYE: CPT | Performed by: INTERNAL MEDICINE

## 2020-07-01 ENCOUNTER — TELEPHONE (OUTPATIENT)
Dept: INTERNAL MEDICINE CLINIC | Facility: CLINIC | Age: 55
End: 2020-07-01

## 2020-07-01 DIAGNOSIS — R59.9 ADENOPATHY: Primary | ICD-10-CM

## 2020-07-02 ENCOUNTER — TELEPHONE (OUTPATIENT)
Dept: INTERNAL MEDICINE CLINIC | Facility: CLINIC | Age: 55
End: 2020-07-02

## 2020-07-02 DIAGNOSIS — E34.9 INCREASED PTH LEVEL: Primary | ICD-10-CM

## 2020-07-02 NOTE — TELEPHONE ENCOUNTER
Patient looking for lab results dated 6/26. Informed that Dr. Anuj Shukla is on vacation, but will message covering physician to see if the results can be reviewed and relayed to her.      Addend:   Spoke with patient regarding CT neck, as well as lab results

## 2020-07-02 NOTE — TELEPHONE ENCOUNTER
Result Notes for CT SOFT TISSUE OF NECK(CONTRAST ONLY) (CPT=70491)     Notes recorded by Edward Bartholomew MD on 7/1/2020 at 12:04 AM CDT  Orbie Fraction, CAT scan with contrast showed some lymph nodes in the parotid region also states which are slightly incr

## 2020-07-06 RX ORDER — METFORMIN HYDROCHLORIDE 500 MG/1
TABLET, EXTENDED RELEASE ORAL
Qty: 30 TABLET | Refills: 0 | Status: SHIPPED | OUTPATIENT
Start: 2020-07-06 | End: 2021-06-11

## 2020-07-20 ENCOUNTER — TELEPHONE (OUTPATIENT)
Dept: INTERNAL MEDICINE CLINIC | Facility: CLINIC | Age: 55
End: 2020-07-20

## 2020-07-20 NOTE — TELEPHONE ENCOUNTER
Received fax from [facility]. Request alternative rx for metformin    also 90 day supply       Addend: To discontinue metformin ER due to impurities.   Changed to metformin 500 mg p.o., 1 daily

## 2020-10-26 ENCOUNTER — TELEPHONE (OUTPATIENT)
Dept: HEMATOLOGY/ONCOLOGY | Facility: HOSPITAL | Age: 55
End: 2020-10-26

## 2020-11-18 ENCOUNTER — OFFICE VISIT (OUTPATIENT)
Dept: HEMATOLOGY/ONCOLOGY | Facility: HOSPITAL | Age: 55
End: 2020-11-18
Attending: INTERNAL MEDICINE
Payer: COMMERCIAL

## 2020-11-18 VITALS
DIASTOLIC BLOOD PRESSURE: 83 MMHG | HEART RATE: 110 BPM | RESPIRATION RATE: 18 BRPM | TEMPERATURE: 98 F | OXYGEN SATURATION: 98 % | SYSTOLIC BLOOD PRESSURE: 138 MMHG

## 2020-11-18 DIAGNOSIS — Z51.81 ENCOUNTER FOR MONITORING AROMATASE INHIBITOR THERAPY: ICD-10-CM

## 2020-11-18 DIAGNOSIS — Z17.0 MALIGNANT NEOPLASM OF LOWER-OUTER QUADRANT OF LEFT BREAST OF FEMALE, ESTROGEN RECEPTOR POSITIVE (HCC): Primary | ICD-10-CM

## 2020-11-18 DIAGNOSIS — F41.9 ANXIETY: ICD-10-CM

## 2020-11-18 DIAGNOSIS — Z87.2 HISTORY OF PSORIASIS: ICD-10-CM

## 2020-11-18 DIAGNOSIS — Z78.0 ASYMPTOMATIC MENOPAUSE: ICD-10-CM

## 2020-11-18 DIAGNOSIS — Z79.811 ENCOUNTER FOR MONITORING AROMATASE INHIBITOR THERAPY: ICD-10-CM

## 2020-11-18 DIAGNOSIS — C50.512 MALIGNANT NEOPLASM OF LOWER-OUTER QUADRANT OF LEFT BREAST OF FEMALE, ESTROGEN RECEPTOR POSITIVE (HCC): Primary | ICD-10-CM

## 2020-11-18 DIAGNOSIS — E55.9 VITAMIN D DEFICIENCY: ICD-10-CM

## 2020-11-18 PROCEDURE — 99214 OFFICE O/P EST MOD 30 MIN: CPT | Performed by: INTERNAL MEDICINE

## 2020-11-18 RX ORDER — ANASTROZOLE 1 MG/1
1 TABLET ORAL DAILY
Qty: 90 TABLET | Refills: 3 | Status: SHIPPED | OUTPATIENT
Start: 2020-11-18 | End: 2021-12-30

## 2020-11-18 NOTE — PROGRESS NOTES
HPI   11/18/2020  Edson Contreras is a 45year old female with a history of triple positive, pT1c Nmi M0 infiltrating ductal carcinoma of the left breast diagnosed in 2010.   Her mammogram performed 5/12/2020 was benign and revealed category B with scatte change, chills and fever. Upset with weight gain   HENT: Negative for ear pain, mouth sores and sore throat. TMJ   Eyes: Positive for visual disturbance.         Patient had Lasik procedures   Respiratory: Negative for cough, chest tightness MASS IN THE LEFT BREAST   • Dysplasia of cervix 2002    LEEP   • Encounter for monitoring aromatase inhibitor therapy 4/10/2018   • Hepatitis C 3/16/15    Dr. Hansel Barber [place] - RNA PCR not det 6/13/15   • History of pregnancy     per NG:    • Infil Smoking status: Never Smoker      Smokeless tobacco: Never Used    Substance and Sexual Activity      Alcohol use:  Yes        Alcohol/week: 0.8 standard drinks        Types: 1 Glasses of wine per week        Comment: rarely      Drug use: No      Sexual ac Normocephalic and atraumatic. Mouth/Throat: No oropharyngeal exudate. Eyes: Pupils are equal, round, and reactive to light. Conjunctivae and EOM are normal. No scleral icterus. Mild conjunctivitis   Neck: Normal range of motion. Neck supple.    Cardio documented in June 2020. She is advised to continue vitamin D supplements. Patient has a history of hyperglycemia. She has discontinued metformin.       Patient is encouraged to again lose weight with a healthy diet and increase her exercise program. intrinsic inflammatory change. There is   no radiopaque sialolith.   2. No additional suspicious neck mass or cervical lymphadenopathy  ==========================================================  Component      Latest Ref Rng & Units 6/26/2020 6/26/2020 Cholesterol Calc      mg/dL (calc)  125 (H)   CHOL/HDLC RATIO      <5.0 (calc)  4.9   NON-HDL CHOLESTEROL      <130 mg/dL (calc)  154 (H)   WBC      < OR = 5 /HPF     RED BLOOD CELLS      < OR = 2 /HPF     SQUAMOUS EPITHELIAL CELLS      < OR = 5 /HPF     B 140   POTASSIUM, SERUM      3.5 - 5.3 mmol/L 4.3   CHLORIDE, SERUM      98 - 110 mmol/L 104   CARBON DIOXIDE      20 - 32 mmol/L 25   CALCIUM      8.6 - 10.4 mg/dL 10.8 (H)   PROTEIN, TOTAL      6.1 - 8.1 g/dL 7.1   Albumin      3.6 - 5.1 g/dL 4.7   Globul %.  =====================================================================  Component      Latest Ref Rng & Units 11/10/2017   ESTRADIOL      pg/mL <20.0   FSH      mIU/mL 35.4

## 2020-11-22 PROBLEM — Z87.2 HISTORY OF PSORIASIS: Status: ACTIVE | Noted: 2020-11-22

## 2021-03-18 DIAGNOSIS — Z23 NEED FOR VACCINATION: ICD-10-CM

## 2021-04-23 ENCOUNTER — TELEPHONE (OUTPATIENT)
Dept: INTERNAL MEDICINE CLINIC | Facility: CLINIC | Age: 56
End: 2021-04-23

## 2021-05-28 ENCOUNTER — HOSPITAL ENCOUNTER (OUTPATIENT)
Dept: MAMMOGRAPHY | Facility: HOSPITAL | Age: 56
Discharge: HOME | End: 2021-05-28
Attending: INTERNAL MEDICINE
Payer: COMMERCIAL

## 2021-05-28 ENCOUNTER — HOSPITAL ENCOUNTER (OUTPATIENT)
Dept: BONE DENSITY | Facility: HOSPITAL | Age: 56
Discharge: HOME | End: 2021-05-28
Attending: INTERNAL MEDICINE
Payer: COMMERCIAL

## 2021-05-28 DIAGNOSIS — Z17.0 MALIGNANT NEOPLASM OF LOWER-OUTER QUADRANT OF LEFT BREAST OF FEMALE, ESTROGEN RECEPTOR POSITIVE (HCC): ICD-10-CM

## 2021-05-28 DIAGNOSIS — C50.512 MALIGNANT NEOPLASM OF LOWER-OUTER QUADRANT OF LEFT BREAST OF FEMALE, ESTROGEN RECEPTOR POSITIVE (HCC): ICD-10-CM

## 2021-05-28 DIAGNOSIS — Z78.0 ASYMPTOMATIC MENOPAUSE: ICD-10-CM

## 2021-05-28 PROCEDURE — 77063 BREAST TOMOSYNTHESIS BI: CPT | Performed by: INTERNAL MEDICINE

## 2021-05-28 PROCEDURE — 77067 SCR MAMMO BI INCL CAD: CPT | Performed by: INTERNAL MEDICINE

## 2021-05-28 PROCEDURE — 77080 DXA BONE DENSITY AXIAL: CPT | Performed by: INTERNAL MEDICINE

## 2021-06-11 ENCOUNTER — OFFICE VISIT (OUTPATIENT)
Dept: INTERNAL MEDICINE CLINIC | Facility: CLINIC | Age: 56
End: 2021-06-11
Payer: COMMERCIAL

## 2021-06-11 VITALS
BODY MASS INDEX: 36.71 KG/M2 | SYSTOLIC BLOOD PRESSURE: 128 MMHG | HEART RATE: 69 BPM | WEIGHT: 223 LBS | HEIGHT: 65.5 IN | DIASTOLIC BLOOD PRESSURE: 81 MMHG | OXYGEN SATURATION: 98 %

## 2021-06-11 DIAGNOSIS — Z00.00 GENERAL MEDICAL EXAM: Primary | ICD-10-CM

## 2021-06-11 DIAGNOSIS — R73.03 PREDIABETES: ICD-10-CM

## 2021-06-11 DIAGNOSIS — Z92.29 STATUS POST ADMINISTRATION OF ALL DOSES OF COVID-19 VACCINE SERIES: ICD-10-CM

## 2021-06-11 DIAGNOSIS — C50.512 MALIGNANT NEOPLASM OF LOWER-OUTER QUADRANT OF LEFT BREAST OF FEMALE, ESTROGEN RECEPTOR POSITIVE (HCC): ICD-10-CM

## 2021-06-11 DIAGNOSIS — E34.9 INCREASED PTH LEVEL: ICD-10-CM

## 2021-06-11 DIAGNOSIS — Z17.0 MALIGNANT NEOPLASM OF LOWER-OUTER QUADRANT OF LEFT BREAST OF FEMALE, ESTROGEN RECEPTOR POSITIVE (HCC): ICD-10-CM

## 2021-06-11 DIAGNOSIS — E66.01 CLASS 2 SEVERE OBESITY DUE TO EXCESS CALORIES WITH SERIOUS COMORBIDITY AND BODY MASS INDEX (BMI) OF 36.0 TO 36.9 IN ADULT (HCC): ICD-10-CM

## 2021-06-11 PROCEDURE — 99396 PREV VISIT EST AGE 40-64: CPT | Performed by: INTERNAL MEDICINE

## 2021-06-11 PROCEDURE — 3008F BODY MASS INDEX DOCD: CPT | Performed by: INTERNAL MEDICINE

## 2021-06-11 PROCEDURE — 3051F HG A1C>EQUAL 7.0%<8.0%: CPT | Performed by: INTERNAL MEDICINE

## 2021-06-11 PROCEDURE — 3074F SYST BP LT 130 MM HG: CPT | Performed by: INTERNAL MEDICINE

## 2021-06-11 PROCEDURE — 3079F DIAST BP 80-89 MM HG: CPT | Performed by: INTERNAL MEDICINE

## 2021-06-11 NOTE — PROGRESS NOTES
HPI:    Patient ID: Zulma Nunez is a 60year old female.     HPI     Patient is here to establish care and annual physical .    Constance Kumari is prediabetic , of triple positive, pT1c Nmi M0 infiltrating ductal carcinoma of the left breast diagnosed in 2010, 11/22/2020   • Infiltrating ductal carcinoma of breast, stage 2 ([facility] [address]     per NG: infiltrating ductal carcinoma grade 2   • Malignant neoplasm of breast (female), unspecified site 1/13/2012    2010:  lumpectomy    • Malignant neoplasm of lower-outer quadra chest tightness, shortness of breath and wheezing. Cardiovascular: Negative for chest pain and leg swelling. Gastrointestinal: Negative for abdominal pain. Endocrine: Negative for cold intolerance and heat intolerance.    Genitourinary: Negative for is no abdominal tenderness. There is no guarding or rebound. Hernia: No hernia is present. Musculoskeletal:         General: Normal range of motion. Cervical back: Normal range of motion.    Lymphadenopathy:      Cervical: No cervical adenopathy calories with serious comorbidity and body mass index (BMI) of 36.0 to 36.9 in adult (HCC)  Limit intake of red meat, sugary foods and beverages, saturated fats, sodium , refined carbohydrates and alcohol . Encourage intake of vegetables, fiver.  low -fat d

## 2021-06-11 NOTE — PATIENT INSTRUCTIONS
Understanding Carbohydrates  A car needs the right type of fuel to run. And you need the right kind of food to function. To keep your energy level up, your body needs food that has carbohydrates.  But carbs raise blood sugar levels higher and faster than meal, depending on your case. Carb counting is a system that helps you keep track of the carbohydrates you eat at each meal.   Carbs come from a variety of foods. These include grains, starchy vegetables, fruit, milk, beans, and snack foods.  You can either sizes.  · Compare labels. Compare the labels of different products. Look at serving sizes and total carbs to find the products that work best for you.   · Don't forget protein and fat.  With the focus on carb counting, it might be easy to forget protein and Saturated fats raise your levels of cholesterol, so keep these fats to a minimum. They are found in foods such as fatty meats, whole milk, cheese, and palm and coconut oils.  Avoid trans fats because they lower good cholesterol as well as raise bad choleste Good choices include fish, skinless chicken and turkey, and beans. Draining the fat from cooked ground meat is another way to reduce the amount of fat you eat. · Low-fat and nonfat dairy provide nutrients without a lot of fat.  Try low-fat or nonfat milk, information is not intended as a substitute for professional medical care. Always follow your healthcare professional's instructions.

## 2021-06-18 ENCOUNTER — TELEPHONE (OUTPATIENT)
Dept: INTERNAL MEDICINE CLINIC | Facility: CLINIC | Age: 56
End: 2021-06-18

## 2021-06-18 NOTE — TELEPHONE ENCOUNTER
Patient left voicemail on nurse referral line today, 6/18 11:34am  States that she was seen by Dr. Malini Nava on 6/11 and she's not sure if she left a urine sample as asked. ---    Reviewed chart. Orders for microalbumin noted; no urine result.   Called pt back

## 2021-06-18 NOTE — TELEPHONE ENCOUNTER
Of note, patient called back and scheduled nurse visit for Monday, 6/21 at 4pm to drop off urine sample.

## 2021-06-21 ENCOUNTER — NURSE ONLY (OUTPATIENT)
Dept: INTERNAL MEDICINE CLINIC | Facility: CLINIC | Age: 56
End: 2021-06-21
Payer: COMMERCIAL

## 2021-06-22 PROCEDURE — 3061F NEG MICROALBUMINURIA REV: CPT | Performed by: INTERNAL MEDICINE

## 2021-07-05 RX ORDER — ATORVASTATIN CALCIUM 10 MG/1
10 TABLET, FILM COATED ORAL NIGHTLY
Qty: 90 TABLET | Refills: 3 | Status: SHIPPED | OUTPATIENT
Start: 2021-07-05

## 2021-07-16 ENCOUNTER — TELEPHONE (OUTPATIENT)
Dept: INTERNAL MEDICINE CLINIC | Facility: CLINIC | Age: 56
End: 2021-07-16

## 2021-07-16 DIAGNOSIS — E21.3 HYPERPARATHYROIDISM (HCC): Primary | ICD-10-CM

## 2021-07-16 NOTE — TELEPHONE ENCOUNTER
----- Message from Anabel Chavez MD sent at 7/5/2021 11:07 PM CDT -----  Please make sure she read my notes ( thanks)    Pan Collado persists to have high PTH. This indicate hyperparthyroism .  I like you to see Endocrinologist, , Dr. Efren Christiansen for th

## 2021-07-16 NOTE — TELEPHONE ENCOUNTER
Attempted to call patient 3 times. The first two times, call was answered but a lot of noise and commotion in the background. Third attempt, patient answered. Informed her of results as noted below. She attested understanding.  Referral placed and s

## 2021-10-01 ENCOUNTER — LAB ENCOUNTER (OUTPATIENT)
Dept: LAB | Facility: HOSPITAL | Age: 56
End: 2021-10-01
Attending: INTERNAL MEDICINE
Payer: COMMERCIAL

## 2021-10-01 ENCOUNTER — OFFICE VISIT (OUTPATIENT)
Dept: ENDOCRINOLOGY CLINIC | Facility: CLINIC | Age: 56
End: 2021-10-01
Payer: COMMERCIAL

## 2021-10-01 VITALS
RESPIRATION RATE: 18 BRPM | DIASTOLIC BLOOD PRESSURE: 96 MMHG | HEART RATE: 80 BPM | SYSTOLIC BLOOD PRESSURE: 149 MMHG | HEIGHT: 65.5 IN | WEIGHT: 217 LBS | BODY MASS INDEX: 35.72 KG/M2

## 2021-10-01 DIAGNOSIS — E55.9 VITAMIN D DEFICIENCY: ICD-10-CM

## 2021-10-01 DIAGNOSIS — E21.3 HYPERPARATHYROIDISM (HCC): Primary | ICD-10-CM

## 2021-10-01 DIAGNOSIS — C50.512 MALIGNANT NEOPLASM OF LOWER-OUTER QUADRANT OF LEFT BREAST OF FEMALE, ESTROGEN RECEPTOR POSITIVE (HCC): ICD-10-CM

## 2021-10-01 DIAGNOSIS — Z17.0 MALIGNANT NEOPLASM OF LOWER-OUTER QUADRANT OF LEFT BREAST OF FEMALE, ESTROGEN RECEPTOR POSITIVE (HCC): ICD-10-CM

## 2021-10-01 PROCEDURE — 82310 ASSAY OF CALCIUM: CPT | Performed by: INTERNAL MEDICINE

## 2021-10-01 PROCEDURE — 3080F DIAST BP >= 90 MM HG: CPT | Performed by: INTERNAL MEDICINE

## 2021-10-01 PROCEDURE — 3008F BODY MASS INDEX DOCD: CPT | Performed by: INTERNAL MEDICINE

## 2021-10-01 PROCEDURE — 86300 IMMUNOASSAY TUMOR CA 15-3: CPT

## 2021-10-01 PROCEDURE — 82306 VITAMIN D 25 HYDROXY: CPT | Performed by: INTERNAL MEDICINE

## 2021-10-01 PROCEDURE — 36415 COLL VENOUS BLD VENIPUNCTURE: CPT | Performed by: INTERNAL MEDICINE

## 2021-10-01 PROCEDURE — 3077F SYST BP >= 140 MM HG: CPT | Performed by: INTERNAL MEDICINE

## 2021-10-01 PROCEDURE — 99213 OFFICE O/P EST LOW 20 MIN: CPT | Performed by: INTERNAL MEDICINE

## 2021-10-01 NOTE — H&P
New Patient Evaluation - History and Physical    CONSULT - Reason for Visit:  High PTH  Requesting Physician: Dr. Chaz Urrutia:  Patient presents with:  Consult: PCP referred for abnormal.        HISTORY OF PRESENT ILLNESS:   Trinidad Hicks is appendix 1995    per NG: appendectomy       PAST SURGICAL HISTORY:   Past Surgical History:   Procedure Laterality Date   • APPENDECTOMY  1995    per NG: REMOVED PART OF LARGE BOWEL   • BREAST BIOPSY Left 4/26/2010    US guided biopsy left breast   • CHEMO Cancer Paternal Grandfather    • Breast Cancer Self 45        left       ASSESSMENTS:     REVIEW OF SYSTEMS:  Constitutional: Negative for: weight change, fever, fatigue, cold/heat intolerance  Eyes: Negative for:  Visual changes, proptosis, blurring  ENT: old with primary hyperparathyroidism.    Highest calcium 10.8  More recent calcium  10  DXA: osteopenia, with worsening BMD , but 10 year fracture risk if not high    I reviewed the following with her:     - Calcium regulation  - Role of PTH and vitamin D i

## 2021-10-03 RX ORDER — MULTIVIT-MIN/IRON/FOLIC ACID/K 18-600-40
1 CAPSULE ORAL DAILY
Qty: 90 CAPSULE | Refills: 3 | Status: SHIPPED | OUTPATIENT
Start: 2021-10-03 | End: 2021-11-02

## 2021-10-04 ENCOUNTER — TELEPHONE (OUTPATIENT)
Dept: ENDOCRINOLOGY CLINIC | Facility: CLINIC | Age: 56
End: 2021-10-04

## 2021-10-04 DIAGNOSIS — E55.9 VITAMIN D DEFICIENCY: Primary | ICD-10-CM

## 2021-10-04 NOTE — TELEPHONE ENCOUNTER
Patient has high calcium related to high PTH level  Calcium is 10.8 which is slightly high  Vit D isslightly  low, but we will avoid replacement at this time given that can raise her calcium even more  Hence recommend that she  Get enough sunlight, hydrate

## 2021-10-11 NOTE — TELEPHONE ENCOUNTER
Pt. Is returning the nurses call, and is requesting for the nurse to call her back at the work phone number until 3:30, and call the home # any time after 4:15-4:30.

## 2021-12-30 DIAGNOSIS — C50.912 MALIGNANT NEOPLASM OF UNSPECIFIED SITE OF LEFT FEMALE BREAST (HCC): ICD-10-CM

## 2021-12-30 RX ORDER — ANASTROZOLE 1 MG/1
1 TABLET ORAL DAILY
Qty: 90 TABLET | Refills: 1 | Status: SHIPPED | OUTPATIENT
Start: 2021-12-30 | End: 2022-09-19

## 2022-03-23 ENCOUNTER — TELEPHONE (OUTPATIENT)
Dept: HEMATOLOGY/ONCOLOGY | Facility: HOSPITAL | Age: 57
End: 2022-03-23

## 2022-03-23 NOTE — TELEPHONE ENCOUNTER
LVMTCB for an overdue F/U with another provider Dr. Sejal Pierson or Dr. Roberto Beard pt.  Call # 1 on 3/23/222

## 2022-03-25 ENCOUNTER — TELEPHONE (OUTPATIENT)
Dept: HEMATOLOGY/ONCOLOGY | Facility: HOSPITAL | Age: 57
End: 2022-03-25

## 2022-04-07 ENCOUNTER — TELEPHONE (OUTPATIENT)
Dept: HEMATOLOGY/ONCOLOGY | Facility: HOSPITAL | Age: 57
End: 2022-04-07

## 2022-04-07 NOTE — TELEPHONE ENCOUNTER
[facility] to schedule an overdue F/U appointment, Breast pt of Dr. Teddy Godinez, called 4/7/22

## 2022-04-13 ENCOUNTER — TELEPHONE (OUTPATIENT)
Dept: HEMATOLOGY/ONCOLOGY | Facility: HOSPITAL | Age: 57
End: 2022-04-13

## 2022-04-13 NOTE — TELEPHONE ENCOUNTER
This patient have been called on 3 different days and we have not been able to reach this patient to schedule appointment with another provider.

## 2022-04-25 ENCOUNTER — TELEPHONE (OUTPATIENT)
Dept: HEMATOLOGY/ONCOLOGY | Facility: HOSPITAL | Age: 57
End: 2022-04-25

## 2022-04-25 NOTE — TELEPHONE ENCOUNTER
This patient have been called on 4 different days and we have not been able to reach this patient to schedule appointment with another provider.  Called 4/25/22

## 2022-06-17 ENCOUNTER — LAB ENCOUNTER (OUTPATIENT)
Dept: LAB | Facility: HOSPITAL | Age: 57
End: 2022-06-17
Attending: INTERNAL MEDICINE
Payer: COMMERCIAL

## 2022-06-17 ENCOUNTER — OFFICE VISIT (OUTPATIENT)
Dept: INTERNAL MEDICINE CLINIC | Facility: CLINIC | Age: 57
End: 2022-06-17
Payer: COMMERCIAL

## 2022-06-17 ENCOUNTER — TELEPHONE (OUTPATIENT)
Dept: INTERNAL MEDICINE CLINIC | Facility: CLINIC | Age: 57
End: 2022-06-17

## 2022-06-17 VITALS
TEMPERATURE: 97 F | SYSTOLIC BLOOD PRESSURE: 130 MMHG | HEIGHT: 65.5 IN | OXYGEN SATURATION: 98 % | DIASTOLIC BLOOD PRESSURE: 88 MMHG | HEART RATE: 82 BPM | BODY MASS INDEX: 35.23 KG/M2 | WEIGHT: 214 LBS

## 2022-06-17 DIAGNOSIS — R73.03 PREDIABETES: ICD-10-CM

## 2022-06-17 DIAGNOSIS — Z12.31 ENCOUNTER FOR SCREENING MAMMOGRAM FOR BREAST CANCER: ICD-10-CM

## 2022-06-17 DIAGNOSIS — L21.0 SEBORRHEA CAPITIS IN ADULT: ICD-10-CM

## 2022-06-17 DIAGNOSIS — Z85.3 HISTORY OF BREAST CANCER: ICD-10-CM

## 2022-06-17 DIAGNOSIS — Z00.00 PREVENTATIVE HEALTH CARE: Primary | ICD-10-CM

## 2022-06-17 DIAGNOSIS — Z12.31 ENCOUNTER FOR SCREENING MAMMOGRAM FOR BREAST CANCER: Primary | ICD-10-CM

## 2022-06-17 DIAGNOSIS — E21.3 HYPERPARATHYROIDISM (HCC): ICD-10-CM

## 2022-06-17 LAB
ALBUMIN SERPL-MCNC: 3.8 G/DL (ref 3.4–5)
ALBUMIN/GLOB SERPL: 1.2 {RATIO} (ref 1–2)
ALP LIVER SERPL-CCNC: 55 U/L
ALT SERPL-CCNC: 22 U/L
ANION GAP SERPL CALC-SCNC: 8 MMOL/L (ref 0–18)
AST SERPL-CCNC: 13 U/L (ref 15–37)
BASOPHILS # BLD AUTO: 0.05 X10(3) UL (ref 0–0.2)
BASOPHILS NFR BLD AUTO: 0.8 %
BILIRUB SERPL-MCNC: 0.6 MG/DL (ref 0.1–2)
BUN BLD-MCNC: 19 MG/DL (ref 7–18)
BUN/CREAT SERPL: 27.5 (ref 10–20)
CALCIUM BLD-MCNC: 9.7 MG/DL (ref 8.5–10.1)
CHLORIDE SERPL-SCNC: 107 MMOL/L (ref 98–112)
CHOLEST SERPL-MCNC: 130 MG/DL (ref ?–200)
CO2 SERPL-SCNC: 26 MMOL/L (ref 21–32)
CREAT BLD-MCNC: 0.69 MG/DL
DEPRECATED RDW RBC AUTO: 42.3 FL (ref 35.1–46.3)
EOSINOPHIL # BLD AUTO: 0.06 X10(3) UL (ref 0–0.7)
EOSINOPHIL NFR BLD AUTO: 1 %
ERYTHROCYTE [DISTWIDTH] IN BLOOD BY AUTOMATED COUNT: 12.8 % (ref 11–15)
EST. AVERAGE GLUCOSE BLD GHB EST-MCNC: 134 MG/DL (ref 68–126)
FASTING PATIENT LIPID ANSWER: YES
FASTING STATUS PATIENT QL REPORTED: YES
GLOBULIN PLAS-MCNC: 3.3 G/DL (ref 2.8–4.4)
GLUCOSE BLD-MCNC: 133 MG/DL (ref 70–99)
HBA1C MFR BLD: 6.3 % (ref ?–5.7)
HCT VFR BLD AUTO: 40 %
HDLC SERPL-MCNC: 43 MG/DL (ref 40–59)
HGB BLD-MCNC: 13.2 G/DL
IMM GRANULOCYTES # BLD AUTO: 0.03 X10(3) UL (ref 0–1)
IMM GRANULOCYTES NFR BLD: 0.5 %
LDLC SERPL CALC-MCNC: 67 MG/DL (ref ?–100)
LYMPHOCYTES # BLD AUTO: 1.85 X10(3) UL (ref 1–4)
LYMPHOCYTES NFR BLD AUTO: 30.7 %
MCH RBC QN AUTO: 30.1 PG (ref 26–34)
MCHC RBC AUTO-ENTMCNC: 33 G/DL (ref 31–37)
MCV RBC AUTO: 91.3 FL
MONOCYTES # BLD AUTO: 0.53 X10(3) UL (ref 0.1–1)
MONOCYTES NFR BLD AUTO: 8.8 %
NEUTROPHILS # BLD AUTO: 3.5 X10 (3) UL (ref 1.5–7.7)
NEUTROPHILS # BLD AUTO: 3.5 X10(3) UL (ref 1.5–7.7)
NEUTROPHILS NFR BLD AUTO: 58.2 %
NONHDLC SERPL-MCNC: 87 MG/DL (ref ?–130)
OSMOLALITY SERPL CALC.SUM OF ELEC: 296 MOSM/KG (ref 275–295)
PHOSPHATE SERPL-MCNC: 2.9 MG/DL (ref 2.5–4.9)
PLATELET # BLD AUTO: 209 10(3)UL (ref 150–450)
POTASSIUM SERPL-SCNC: 4.2 MMOL/L (ref 3.5–5.1)
PROT SERPL-MCNC: 7.1 G/DL (ref 6.4–8.2)
PTH-INTACT SERPL-MCNC: 135.7 PG/ML (ref 18.5–88)
RBC # BLD AUTO: 4.38 X10(6)UL
SODIUM SERPL-SCNC: 141 MMOL/L (ref 136–145)
TRIGL SERPL-MCNC: 110 MG/DL (ref 30–149)
TSI SER-ACNC: 0.75 MIU/ML (ref 0.36–3.74)
VLDLC SERPL CALC-MCNC: 17 MG/DL (ref 0–30)
WBC # BLD AUTO: 6 X10(3) UL (ref 4–11)

## 2022-06-17 PROCEDURE — 83970 ASSAY OF PARATHORMONE: CPT

## 2022-06-17 PROCEDURE — 80053 COMPREHEN METABOLIC PANEL: CPT | Performed by: INTERNAL MEDICINE

## 2022-06-17 PROCEDURE — 3075F SYST BP GE 130 - 139MM HG: CPT | Performed by: INTERNAL MEDICINE

## 2022-06-17 PROCEDURE — 99212 OFFICE O/P EST SF 10 MIN: CPT | Performed by: INTERNAL MEDICINE

## 2022-06-17 PROCEDURE — 80061 LIPID PANEL: CPT | Performed by: INTERNAL MEDICINE

## 2022-06-17 PROCEDURE — 85025 COMPLETE CBC W/AUTO DIFF WBC: CPT | Performed by: INTERNAL MEDICINE

## 2022-06-17 PROCEDURE — 3008F BODY MASS INDEX DOCD: CPT | Performed by: INTERNAL MEDICINE

## 2022-06-17 PROCEDURE — 3079F DIAST BP 80-89 MM HG: CPT | Performed by: INTERNAL MEDICINE

## 2022-06-17 PROCEDURE — 36415 COLL VENOUS BLD VENIPUNCTURE: CPT | Performed by: INTERNAL MEDICINE

## 2022-06-17 PROCEDURE — 84443 ASSAY THYROID STIM HORMONE: CPT | Performed by: INTERNAL MEDICINE

## 2022-06-17 PROCEDURE — 84100 ASSAY OF PHOSPHORUS: CPT

## 2022-06-17 PROCEDURE — 83036 HEMOGLOBIN GLYCOSYLATED A1C: CPT | Performed by: INTERNAL MEDICINE

## 2022-06-17 RX ORDER — CALCIUM CARBONATE/VITAMIN D3 600 MG-20
1 TABLET ORAL DAILY
COMMUNITY
Start: 2022-03-27 | End: 2022-06-17

## 2022-06-17 RX ORDER — ATORVASTATIN CALCIUM 10 MG/1
10 TABLET, FILM COATED ORAL NIGHTLY
Qty: 90 TABLET | Refills: 1 | Status: SHIPPED | OUTPATIENT
Start: 2022-06-17

## 2022-06-17 NOTE — TELEPHONE ENCOUNTER
Spoke with Aliya Alberto and changed orders from  [facility] TORSTEN 2D+3D DIAGNOSTIC ADDL VWS BILAT to [facility] TORSTEN 2D+3D SCREENING BILAT. Aliya Alberot will reach out to patient to schedule an appointment.

## 2022-06-17 NOTE — TELEPHONE ENCOUNTER
Left message. Please reschedule patient's appointment from December to September (3 month f/u). Per  need to discuss diabetes from lab results.

## 2022-06-17 NOTE — TELEPHONE ENCOUNTER
Denisse Perez from [facility] lab called stating that one of the lab orders in coded wrong and unable to schedule it  To please call her back at [phone]

## 2022-07-02 ENCOUNTER — HOSPITAL ENCOUNTER (OUTPATIENT)
Dept: MAMMOGRAPHY | Facility: HOSPITAL | Age: 57
Discharge: HOME | End: 2022-07-02
Attending: INTERNAL MEDICINE
Payer: COMMERCIAL

## 2022-07-02 DIAGNOSIS — Z12.31 ENCOUNTER FOR SCREENING MAMMOGRAM FOR BREAST CANCER: ICD-10-CM

## 2022-07-02 DIAGNOSIS — Z85.3 HISTORY OF BREAST CANCER: ICD-10-CM

## 2022-07-02 PROCEDURE — 77063 BREAST TOMOSYNTHESIS BI: CPT | Performed by: INTERNAL MEDICINE

## 2022-07-02 PROCEDURE — 77067 SCR MAMMO BI INCL CAD: CPT | Performed by: INTERNAL MEDICINE

## 2022-07-06 ENCOUNTER — OFFICE VISIT (OUTPATIENT)
Dept: HEMATOLOGY/ONCOLOGY | Facility: HOSPITAL | Age: 57
End: 2022-07-06
Attending: INTERNAL MEDICINE
Payer: COMMERCIAL

## 2022-07-06 VITALS
DIASTOLIC BLOOD PRESSURE: 58 MMHG | HEART RATE: 100 BPM | TEMPERATURE: 98 F | OXYGEN SATURATION: 95 % | HEIGHT: 64 IN | WEIGHT: 216 LBS | BODY MASS INDEX: 36.88 KG/M2 | SYSTOLIC BLOOD PRESSURE: 118 MMHG | RESPIRATION RATE: 18 BRPM

## 2022-07-06 DIAGNOSIS — C50.512 MALIGNANT NEOPLASM OF LOWER-OUTER QUADRANT OF LEFT BREAST OF FEMALE, ESTROGEN RECEPTOR POSITIVE (HCC): Primary | ICD-10-CM

## 2022-07-06 DIAGNOSIS — Z79.811 ENCOUNTER FOR MONITORING AROMATASE INHIBITOR THERAPY: ICD-10-CM

## 2022-07-06 DIAGNOSIS — Z17.0 MALIGNANT NEOPLASM OF LOWER-OUTER QUADRANT OF LEFT BREAST OF FEMALE, ESTROGEN RECEPTOR POSITIVE (HCC): Primary | ICD-10-CM

## 2022-07-06 DIAGNOSIS — Z51.81 ENCOUNTER FOR MONITORING AROMATASE INHIBITOR THERAPY: ICD-10-CM

## 2022-07-06 PROCEDURE — 99214 OFFICE O/P EST MOD 30 MIN: CPT | Performed by: INTERNAL MEDICINE

## 2022-09-19 ENCOUNTER — OFFICE VISIT (OUTPATIENT)
Dept: INTERNAL MEDICINE CLINIC | Facility: CLINIC | Age: 57
End: 2022-09-19

## 2022-09-19 ENCOUNTER — LAB ENCOUNTER (OUTPATIENT)
Dept: LAB | Facility: HOSPITAL | Age: 57
End: 2022-09-19
Attending: INTERNAL MEDICINE

## 2022-09-19 ENCOUNTER — OFFICE VISIT (OUTPATIENT)
Dept: ENDOCRINOLOGY CLINIC | Facility: CLINIC | Age: 57
End: 2022-09-19
Payer: COMMERCIAL

## 2022-09-19 VITALS
SYSTOLIC BLOOD PRESSURE: 142 MMHG | HEART RATE: 92 BPM | BODY MASS INDEX: 36 KG/M2 | DIASTOLIC BLOOD PRESSURE: 92 MMHG | WEIGHT: 212 LBS

## 2022-09-19 VITALS
HEART RATE: 84 BPM | BODY MASS INDEX: 36 KG/M2 | SYSTOLIC BLOOD PRESSURE: 112 MMHG | OXYGEN SATURATION: 97 % | DIASTOLIC BLOOD PRESSURE: 60 MMHG | WEIGHT: 212 LBS | TEMPERATURE: 98 F

## 2022-09-19 DIAGNOSIS — E21.3 HYPERPARATHYROIDISM (HCC): ICD-10-CM

## 2022-09-19 DIAGNOSIS — E55.9 VITAMIN D INSUFFICIENCY: Primary | ICD-10-CM

## 2022-09-19 DIAGNOSIS — L21.0 SEBORRHEA CAPITIS IN ADULT: ICD-10-CM

## 2022-09-19 DIAGNOSIS — E55.9 VITAMIN D DEFICIENCY: Primary | ICD-10-CM

## 2022-09-19 DIAGNOSIS — R73.03 PREDIABETES: Primary | ICD-10-CM

## 2022-09-19 DIAGNOSIS — Z85.3 HISTORY OF BREAST CANCER: ICD-10-CM

## 2022-09-19 DIAGNOSIS — Z86.19 HISTORY OF HEPATITIS C: ICD-10-CM

## 2022-09-19 DIAGNOSIS — E55.9 VITAMIN D INSUFFICIENCY: ICD-10-CM

## 2022-09-19 LAB
CALCIUM BLD-MCNC: 10.4 MG/DL (ref 8.5–10.1)
CARTRIDGE LOT#: 978 NUMERIC
HEMOGLOBIN A1C: 6.3 % (ref 4.3–5.6)
VIT D+METAB SERPL-MCNC: 21.7 NG/ML (ref 30–100)

## 2022-09-19 PROCEDURE — 3074F SYST BP LT 130 MM HG: CPT | Performed by: INTERNAL MEDICINE

## 2022-09-19 PROCEDURE — 3078F DIAST BP <80 MM HG: CPT | Performed by: INTERNAL MEDICINE

## 2022-09-19 PROCEDURE — 99214 OFFICE O/P EST MOD 30 MIN: CPT | Performed by: INTERNAL MEDICINE

## 2022-09-19 PROCEDURE — 82306 VITAMIN D 25 HYDROXY: CPT

## 2022-09-19 PROCEDURE — 36415 COLL VENOUS BLD VENIPUNCTURE: CPT

## 2022-09-19 PROCEDURE — 82310 ASSAY OF CALCIUM: CPT

## 2022-09-19 PROCEDURE — 83036 HEMOGLOBIN GLYCOSYLATED A1C: CPT | Performed by: INTERNAL MEDICINE

## 2022-09-19 RX ORDER — ACETAMINOPHEN 160 MG
2000 TABLET,DISINTEGRATING ORAL DAILY
COMMUNITY
Start: 2022-06-27 | End: 2022-09-19

## 2022-09-21 DIAGNOSIS — C50.912 MALIGNANT NEOPLASM OF UNSPECIFIED SITE OF LEFT FEMALE BREAST (HCC): ICD-10-CM

## 2022-09-21 RX ORDER — ANASTROZOLE 1 MG/1
TABLET ORAL
Qty: 90 TABLET | Refills: 1 | OUTPATIENT
Start: 2022-09-21

## 2022-09-27 ENCOUNTER — HOSPITAL ENCOUNTER (OUTPATIENT)
Dept: ULTRASOUND IMAGING | Facility: HOSPITAL | Age: 57
Discharge: HOME | End: 2022-09-27
Attending: INTERNAL MEDICINE
Payer: COMMERCIAL

## 2022-09-27 DIAGNOSIS — Z86.19 HISTORY OF HEPATITIS C: ICD-10-CM

## 2022-09-27 PROCEDURE — 76705 ECHO EXAM OF ABDOMEN: CPT | Performed by: INTERNAL MEDICINE

## 2022-10-26 ENCOUNTER — OFFICE VISIT (OUTPATIENT)
Dept: DERMATOLOGY CLINIC | Facility: CLINIC | Age: 57
End: 2022-10-26
Payer: COMMERCIAL

## 2022-10-26 ENCOUNTER — TELEPHONE (OUTPATIENT)
Dept: DERMATOLOGY CLINIC | Facility: CLINIC | Age: 57
End: 2022-10-26

## 2022-10-26 DIAGNOSIS — L40.0 PSORIASIS VULGARIS: Primary | ICD-10-CM

## 2022-10-26 DIAGNOSIS — L40.9 SCALP PSORIASIS: ICD-10-CM

## 2022-10-26 PROCEDURE — 99204 OFFICE O/P NEW MOD 45 MIN: CPT | Performed by: DERMATOLOGY

## 2022-10-26 RX ORDER — CLOBETASOL PROPIONATE 0.46 MG/ML
SOLUTION TOPICAL
Qty: 50 ML | Refills: 6 | Status: SHIPPED | OUTPATIENT
Start: 2022-10-26

## 2022-10-26 RX ORDER — TRIAMCINOLONE ACETONIDE 5 MG/G
CREAM TOPICAL
Qty: 60 G | Refills: 1 | Status: SHIPPED | OUTPATIENT
Start: 2022-10-26

## 2022-10-26 RX ORDER — KETOCONAZOLE 20 MG/ML
SHAMPOO TOPICAL
Qty: 120 ML | Refills: 11 | Status: SHIPPED | OUTPATIENT
Start: 2022-10-26

## 2022-10-27 RX ORDER — APREMILAST 30 MG/1
1 TABLET, FILM COATED ORAL 2 TIMES DAILY
Qty: 60 TABLET | Refills: 5 | Status: SHIPPED | OUTPATIENT
Start: 2022-10-27 | End: 2022-11-26

## 2022-10-27 RX ORDER — APREMILAST 10-20-30MG
KIT ORAL
Qty: 55 EACH | Refills: 0 | Status: SHIPPED | OUTPATIENT
Start: 2022-10-27

## 2022-10-27 NOTE — TELEPHONE ENCOUNTER
Hamida Fregoso enrollment form faxed to [facility] at [place] on 10/27/22 and placed in the [employer] PA binder. Awaiting determination.

## 2022-10-31 NOTE — TELEPHONE ENCOUNTER
[facility] asking for chart notes. Clinical staff - please fax pt's chart notes to [facility] at [facility] when available. Thank you.

## 2022-11-02 NOTE — TELEPHONE ENCOUNTER
Dr. Guerline escalante awaiting notes, please let us know when complete so that we can fax, thank you

## 2022-11-18 NOTE — TELEPHONE ENCOUNTER
Fax from [facility]    Approved for Denver Dubin from 11/18/22-11/18/23    Placed fax in pa inbox

## 2022-12-19 ENCOUNTER — OFFICE VISIT (OUTPATIENT)
Dept: INTERNAL MEDICINE CLINIC | Facility: CLINIC | Age: 57
End: 2022-12-19
Payer: COMMERCIAL

## 2022-12-19 VITALS
BODY MASS INDEX: 36.54 KG/M2 | HEART RATE: 71 BPM | HEIGHT: 64 IN | OXYGEN SATURATION: 98 % | DIASTOLIC BLOOD PRESSURE: 72 MMHG | WEIGHT: 214 LBS | SYSTOLIC BLOOD PRESSURE: 110 MMHG

## 2022-12-19 DIAGNOSIS — E55.9 VITAMIN D DEFICIENCY: ICD-10-CM

## 2022-12-19 DIAGNOSIS — Z86.19 HISTORY OF HEPATITIS C: ICD-10-CM

## 2022-12-19 DIAGNOSIS — Z85.3 HISTORY OF BREAST CANCER: ICD-10-CM

## 2022-12-19 DIAGNOSIS — R73.9 HYPERGLYCEMIA: Primary | ICD-10-CM

## 2022-12-19 DIAGNOSIS — E83.52 HYPERCALCEMIA: ICD-10-CM

## 2022-12-19 DIAGNOSIS — E78.5 DYSLIPIDEMIA: ICD-10-CM

## 2022-12-19 PROBLEM — Z79.811 ENCOUNTER FOR MONITORING AROMATASE INHIBITOR THERAPY: Status: RESOLVED | Noted: 2018-04-10 | Resolved: 2022-12-19

## 2022-12-19 PROBLEM — Z51.81 ENCOUNTER FOR MONITORING AROMATASE INHIBITOR THERAPY: Status: RESOLVED | Noted: 2018-04-10 | Resolved: 2022-12-19

## 2022-12-19 PROBLEM — R73.03 PREDIABETES: Status: ACTIVE | Noted: 2022-12-19

## 2022-12-19 LAB
CARTRIDGE LOT#: 536 NUMERIC
HEMOGLOBIN A1C: 6.4 % (ref 4.3–5.6)

## 2023-01-26 ENCOUNTER — LAB ENCOUNTER (OUTPATIENT)
Dept: LAB | Facility: HOSPITAL | Age: 58
End: 2023-01-26
Attending: INTERNAL MEDICINE
Payer: COMMERCIAL

## 2023-01-26 DIAGNOSIS — E21.3 HYPERPARATHYROIDISM (HCC): ICD-10-CM

## 2023-01-26 DIAGNOSIS — E55.9 VITAMIN D DEFICIENCY: ICD-10-CM

## 2023-01-26 LAB
ALBUMIN SERPL-MCNC: 4.1 G/DL (ref 3.4–5)
ALBUMIN/GLOB SERPL: 1.2 {RATIO} (ref 1–2)
ALP LIVER SERPL-CCNC: 69 U/L
ALT SERPL-CCNC: 27 U/L
ANION GAP SERPL CALC-SCNC: 8 MMOL/L (ref 0–18)
AST SERPL-CCNC: 16 U/L (ref 15–37)
BILIRUB SERPL-MCNC: 0.7 MG/DL (ref 0.1–2)
BUN BLD-MCNC: 15 MG/DL (ref 7–18)
BUN/CREAT SERPL: 19.2 (ref 10–20)
CALCIUM BLD-MCNC: 9.8 MG/DL (ref 8.5–10.1)
CHLORIDE SERPL-SCNC: 108 MMOL/L (ref 98–112)
CO2 SERPL-SCNC: 24 MMOL/L (ref 21–32)
CREAT BLD-MCNC: 0.78 MG/DL
FASTING STATUS PATIENT QL REPORTED: YES
GFR SERPLBLD BASED ON 1.73 SQ M-ARVRAT: 87 ML/MIN/1.73M2 (ref 60–?)
GLOBULIN PLAS-MCNC: 3.3 G/DL (ref 2.8–4.4)
GLUCOSE BLD-MCNC: 111 MG/DL (ref 70–99)
OSMOLALITY SERPL CALC.SUM OF ELEC: 292 MOSM/KG (ref 275–295)
POTASSIUM SERPL-SCNC: 3.7 MMOL/L (ref 3.5–5.1)
PROT SERPL-MCNC: 7.4 G/DL (ref 6.4–8.2)
PTH-INTACT SERPL-MCNC: 166.6 PG/ML (ref 18.5–88)
SODIUM SERPL-SCNC: 140 MMOL/L (ref 136–145)
VIT D+METAB SERPL-MCNC: 23.7 NG/ML (ref 30–100)

## 2023-01-26 PROCEDURE — 82306 VITAMIN D 25 HYDROXY: CPT

## 2023-01-26 PROCEDURE — 36415 COLL VENOUS BLD VENIPUNCTURE: CPT | Performed by: INTERNAL MEDICINE

## 2023-01-26 PROCEDURE — 83970 ASSAY OF PARATHORMONE: CPT

## 2023-01-26 PROCEDURE — 80053 COMPREHEN METABOLIC PANEL: CPT | Performed by: INTERNAL MEDICINE

## 2023-01-31 ENCOUNTER — TELEPHONE (OUTPATIENT)
Dept: ENDOCRINOLOGY CLINIC | Facility: CLINIC | Age: 58
End: 2023-01-31

## 2023-01-31 ENCOUNTER — TELEPHONE (OUTPATIENT)
Dept: INTERNAL MEDICINE CLINIC | Facility: CLINIC | Age: 58
End: 2023-01-31

## 2023-01-31 DIAGNOSIS — E21.3 HYPERPARATHYROIDISM (HCC): ICD-10-CM

## 2023-01-31 DIAGNOSIS — E55.9 VITAMIN D DEFICIENCY: Primary | ICD-10-CM

## 2023-01-31 NOTE — TELEPHONE ENCOUNTER
----- Message from Jose Brooks MD sent at 1/26/2023  6:10 PM CST -----  Let know her blood work looks good.   Calcium is back to normal liver enzymes are normal

## 2023-01-31 NOTE — TELEPHONE ENCOUNTER
Calcium is okay   PTH is high but stable  Vit D is  Low but better   She is on 1000 units daily of vit D  Please increase to 2000 units daily   CMP, 25 OH vit D in three months and FU in clinic  Please book   Thanks

## 2023-03-04 NOTE — TELEPHONE ENCOUNTER
Called patient and relayed Dr. Kelly Neal message as outlined below. RN made an appointment as below and [facility] [place] address given to patient. She will get her repeat blood work prior to appointment.     Future Appointments   Date Time Provider Afshan Avitia   6/19/2023  2:15 PM Thelma Gudino MD [address] [place] [address]

## 2023-03-13 RX ORDER — ATORVASTATIN CALCIUM 10 MG/1
TABLET, FILM COATED ORAL
Qty: 90 TABLET | Refills: 0 | Status: SHIPPED | OUTPATIENT
Start: 2023-03-13

## 2023-04-07 ENCOUNTER — LAB ENCOUNTER (OUTPATIENT)
Dept: LAB | Facility: HOSPITAL | Age: 58
End: 2023-04-07
Attending: INTERNAL MEDICINE
Payer: COMMERCIAL

## 2023-04-07 DIAGNOSIS — E21.3 HYPERPARATHYROIDISM (HCC): ICD-10-CM

## 2023-04-07 DIAGNOSIS — E55.9 VITAMIN D DEFICIENCY: ICD-10-CM

## 2023-04-07 LAB
ALBUMIN SERPL-MCNC: 3.9 G/DL (ref 3.4–5)
ALBUMIN/GLOB SERPL: 1.1 {RATIO} (ref 1–2)
ALP LIVER SERPL-CCNC: 57 U/L
ALT SERPL-CCNC: 22 U/L
ANION GAP SERPL CALC-SCNC: 6 MMOL/L (ref 0–18)
AST SERPL-CCNC: 13 U/L (ref 15–37)
BILIRUB SERPL-MCNC: 0.4 MG/DL (ref 0.1–2)
BUN BLD-MCNC: 14 MG/DL (ref 7–18)
BUN/CREAT SERPL: 20.3 (ref 10–20)
CALCIUM BLD-MCNC: 9.4 MG/DL (ref 8.5–10.1)
CHLORIDE SERPL-SCNC: 110 MMOL/L (ref 98–112)
CO2 SERPL-SCNC: 26 MMOL/L (ref 21–32)
CREAT BLD-MCNC: 0.69 MG/DL
FASTING STATUS PATIENT QL REPORTED: YES
GFR SERPLBLD BASED ON 1.73 SQ M-ARVRAT: 100 ML/MIN/1.73M2 (ref 60–?)
GLOBULIN PLAS-MCNC: 3.4 G/DL (ref 2.8–4.4)
GLUCOSE BLD-MCNC: 127 MG/DL (ref 70–99)
OSMOLALITY SERPL CALC.SUM OF ELEC: 296 MOSM/KG (ref 275–295)
POTASSIUM SERPL-SCNC: 4 MMOL/L (ref 3.5–5.1)
PROT SERPL-MCNC: 7.3 G/DL (ref 6.4–8.2)
SODIUM SERPL-SCNC: 142 MMOL/L (ref 136–145)
VIT D+METAB SERPL-MCNC: 25.3 NG/ML (ref 30–100)

## 2023-04-07 PROCEDURE — 36415 COLL VENOUS BLD VENIPUNCTURE: CPT

## 2023-04-07 PROCEDURE — 82306 VITAMIN D 25 HYDROXY: CPT

## 2023-04-07 PROCEDURE — 80053 COMPREHEN METABOLIC PANEL: CPT

## 2023-04-11 DIAGNOSIS — Z86.39 HISTORY OF HYPERCALCEMIA: ICD-10-CM

## 2023-04-11 DIAGNOSIS — E55.9 VITAMIN D DEFICIENCY: Primary | ICD-10-CM

## 2023-04-21 ENCOUNTER — OFFICE VISIT (OUTPATIENT)
Dept: INTERNAL MEDICINE CLINIC | Facility: CLINIC | Age: 58
End: 2023-04-21
Payer: COMMERCIAL

## 2023-04-21 VITALS
TEMPERATURE: 98 F | OXYGEN SATURATION: 98 % | WEIGHT: 198.38 LBS | SYSTOLIC BLOOD PRESSURE: 124 MMHG | DIASTOLIC BLOOD PRESSURE: 80 MMHG | HEART RATE: 74 BPM | BODY MASS INDEX: 34 KG/M2

## 2023-04-21 DIAGNOSIS — R73.03 PREDIABETES: Primary | ICD-10-CM

## 2023-04-21 DIAGNOSIS — Z86.19 HISTORY OF HEPATITIS C: ICD-10-CM

## 2023-04-21 DIAGNOSIS — E78.5 DYSLIPIDEMIA: ICD-10-CM

## 2023-04-21 DIAGNOSIS — E21.3 HYPERPARATHYROIDISM (HCC): ICD-10-CM

## 2023-04-21 DIAGNOSIS — Z85.3 HISTORY OF BREAST CANCER: ICD-10-CM

## 2023-04-21 LAB
CARTRIDGE LOT#: 573 NUMERIC
HEMOGLOBIN A1C: 5.6 % (ref 4.3–5.6)

## 2023-04-21 PROCEDURE — 83036 HEMOGLOBIN GLYCOSYLATED A1C: CPT | Performed by: INTERNAL MEDICINE

## 2023-04-21 PROCEDURE — 3079F DIAST BP 80-89 MM HG: CPT | Performed by: INTERNAL MEDICINE

## 2023-04-21 PROCEDURE — 3074F SYST BP LT 130 MM HG: CPT | Performed by: INTERNAL MEDICINE

## 2023-04-21 PROCEDURE — 99214 OFFICE O/P EST MOD 30 MIN: CPT | Performed by: INTERNAL MEDICINE

## 2023-04-21 RX ORDER — ATORVASTATIN CALCIUM 10 MG/1
10 TABLET, FILM COATED ORAL NIGHTLY
Qty: 90 TABLET | Refills: 0 | Status: SHIPPED | OUTPATIENT
Start: 2023-04-21

## 2023-05-12 ENCOUNTER — HOSPITAL ENCOUNTER (OUTPATIENT)
Dept: ULTRASOUND IMAGING | Age: 58
Discharge: HOME | End: 2023-05-12
Attending: INTERNAL MEDICINE
Payer: COMMERCIAL

## 2023-05-12 DIAGNOSIS — Z86.19 HISTORY OF HEPATITIS C: ICD-10-CM

## 2023-05-12 PROCEDURE — 76981 USE PARENCHYMA: CPT | Performed by: INTERNAL MEDICINE

## 2023-05-12 PROCEDURE — 76705 ECHO EXAM OF ABDOMEN: CPT | Performed by: INTERNAL MEDICINE

## 2023-05-25 ENCOUNTER — TELEPHONE (OUTPATIENT)
Dept: INTERNAL MEDICINE CLINIC | Facility: CLINIC | Age: 58
End: 2023-05-25

## 2023-05-25 NOTE — TELEPHONE ENCOUNTER
Received a letting in the mail from patient requesting a refund for a dos 04/21/2023 that she made a duplicate payment. Called patient back and left voicemail, she needs to contact the billing department to get the refund. Provider the billing number on the message.

## 2023-06-03 ENCOUNTER — HOSPITAL ENCOUNTER (OUTPATIENT)
Dept: BONE DENSITY | Age: 58
Discharge: HOME | End: 2023-06-03
Attending: INTERNAL MEDICINE
Payer: COMMERCIAL

## 2023-06-03 DIAGNOSIS — Z79.811 ENCOUNTER FOR MONITORING AROMATASE INHIBITOR THERAPY: ICD-10-CM

## 2023-06-03 DIAGNOSIS — Z51.81 ENCOUNTER FOR MONITORING AROMATASE INHIBITOR THERAPY: ICD-10-CM

## 2023-06-03 PROCEDURE — 77080 DXA BONE DENSITY AXIAL: CPT | Performed by: INTERNAL MEDICINE

## 2023-06-05 ENCOUNTER — TELEPHONE (OUTPATIENT)
Dept: HEMATOLOGY/ONCOLOGY | Facility: HOSPITAL | Age: 58
End: 2023-06-05

## 2023-06-05 NOTE — TELEPHONE ENCOUNTER
LVM. Dr Pimentel Cover recommending that she discuss her dexa scan results with Dr Michaela Rivas. Scheduled on 6/19. Asked for a call back with questions.

## 2023-06-19 ENCOUNTER — OFFICE VISIT (OUTPATIENT)
Dept: ENDOCRINOLOGY CLINIC | Facility: CLINIC | Age: 58
End: 2023-06-19

## 2023-06-19 ENCOUNTER — LAB ENCOUNTER (OUTPATIENT)
Dept: LAB | Facility: HOSPITAL | Age: 58
End: 2023-06-19
Attending: INTERNAL MEDICINE
Payer: COMMERCIAL

## 2023-06-19 VITALS
HEART RATE: 93 BPM | BODY MASS INDEX: 33 KG/M2 | DIASTOLIC BLOOD PRESSURE: 82 MMHG | SYSTOLIC BLOOD PRESSURE: 130 MMHG | HEIGHT: 65 IN

## 2023-06-19 DIAGNOSIS — Z86.39 HISTORY OF HYPERCALCEMIA: ICD-10-CM

## 2023-06-19 DIAGNOSIS — E55.9 VITAMIN D DEFICIENCY: ICD-10-CM

## 2023-06-19 DIAGNOSIS — M81.0 AGE-RELATED OSTEOPOROSIS WITHOUT CURRENT PATHOLOGICAL FRACTURE: ICD-10-CM

## 2023-06-19 DIAGNOSIS — E55.9 VITAMIN D DEFICIENCY: Primary | ICD-10-CM

## 2023-06-19 LAB
ALBUMIN SERPL-MCNC: 4 G/DL (ref 3.4–5)
ALBUMIN/GLOB SERPL: 1.1 {RATIO} (ref 1–2)
ALP LIVER SERPL-CCNC: 57 U/L
ALT SERPL-CCNC: 24 U/L
ANION GAP SERPL CALC-SCNC: 7 MMOL/L (ref 0–18)
AST SERPL-CCNC: 16 U/L (ref 15–37)
BILIRUB SERPL-MCNC: 0.4 MG/DL (ref 0.1–2)
BUN BLD-MCNC: 19 MG/DL (ref 7–18)
BUN/CREAT SERPL: 26 (ref 10–20)
CALCIUM BLD-MCNC: 10.8 MG/DL (ref 8.5–10.1)
CHLORIDE SERPL-SCNC: 108 MMOL/L (ref 98–112)
CO2 SERPL-SCNC: 25 MMOL/L (ref 21–32)
CREAT BLD-MCNC: 0.73 MG/DL
FASTING STATUS PATIENT QL REPORTED: NO
GFR SERPLBLD BASED ON 1.73 SQ M-ARVRAT: 94 ML/MIN/1.73M2 (ref 60–?)
GLOBULIN PLAS-MCNC: 3.7 G/DL (ref 2.8–4.4)
GLUCOSE BLD-MCNC: 131 MG/DL (ref 70–99)
OSMOLALITY SERPL CALC.SUM OF ELEC: 294 MOSM/KG (ref 275–295)
POTASSIUM SERPL-SCNC: 4.1 MMOL/L (ref 3.5–5.1)
PROT SERPL-MCNC: 7.7 G/DL (ref 6.4–8.2)
PTH-INTACT SERPL-MCNC: 114.9 PG/ML (ref 18.5–88)
SODIUM SERPL-SCNC: 140 MMOL/L (ref 136–145)
VIT D+METAB SERPL-MCNC: 26.9 NG/ML (ref 30–100)

## 2023-06-19 PROCEDURE — 84080 ASSAY ALKALINE PHOSPHATASES: CPT

## 2023-06-19 PROCEDURE — 3079F DIAST BP 80-89 MM HG: CPT | Performed by: INTERNAL MEDICINE

## 2023-06-19 PROCEDURE — 99214 OFFICE O/P EST MOD 30 MIN: CPT | Performed by: INTERNAL MEDICINE

## 2023-06-19 PROCEDURE — 36415 COLL VENOUS BLD VENIPUNCTURE: CPT

## 2023-06-19 PROCEDURE — 80053 COMPREHEN METABOLIC PANEL: CPT

## 2023-06-19 PROCEDURE — 83970 ASSAY OF PARATHORMONE: CPT

## 2023-06-19 PROCEDURE — 82306 VITAMIN D 25 HYDROXY: CPT

## 2023-06-19 PROCEDURE — 3008F BODY MASS INDEX DOCD: CPT | Performed by: INTERNAL MEDICINE

## 2023-06-19 PROCEDURE — 3075F SYST BP GE 130 - 139MM HG: CPT | Performed by: INTERNAL MEDICINE

## 2023-06-19 RX ORDER — ALENDRONATE SODIUM 70 MG/1
70 TABLET ORAL
Qty: 12 TABLET | Refills: 0 | Status: SHIPPED | OUTPATIENT
Start: 2023-06-19

## 2023-06-20 ENCOUNTER — TELEPHONE (OUTPATIENT)
Dept: ENDOCRINOLOGY CLINIC | Facility: CLINIC | Age: 58
End: 2023-06-20

## 2023-06-20 DIAGNOSIS — E21.3 HYPERPARATHYROIDISM (HCC): Primary | ICD-10-CM

## 2023-06-20 NOTE — TELEPHONE ENCOUNTER
Parathyroid hormone remains high  Her calcium is also elevated at 10.8--> please stop any OTC calcium supplements/ calcium containing MV    Vit D is slightly low around 27. But will not replace at a higher amount since that can further raise her calcium  How much OTC vit D is she taking? I can recommend accordingly ( please check if I can my chart there about Vit D recs)       Please repeat calcium in 6-8 weeks  Please order    Patient to call if she develops symptoms of hypercalcemia  Digestive symptoms, such as nausea or vomiting, poor appetite, or constipation. Increased thirst or more frequent urination  Muscle weakness or twitches. Changes in how your brain works, such as feeling tired or fatigued or confused. Bone pain and fragile bones that break more easily.     Thanks

## 2023-06-23 LAB — ALKALINE PHOSPHATASE BONE SPECIFIC: 12.6 UG/L

## 2023-07-05 ENCOUNTER — HOSPITAL ENCOUNTER (OUTPATIENT)
Dept: MAMMOGRAPHY | Facility: HOSPITAL | Age: 58
Discharge: HOME | End: 2023-07-05
Attending: INTERNAL MEDICINE
Payer: COMMERCIAL

## 2023-07-05 DIAGNOSIS — C50.512 MALIGNANT NEOPLASM OF LOWER-OUTER QUADRANT OF LEFT BREAST OF FEMALE, ESTROGEN RECEPTOR POSITIVE: ICD-10-CM

## 2023-07-05 DIAGNOSIS — Z17.0 MALIGNANT NEOPLASM OF LOWER-OUTER QUADRANT OF LEFT BREAST OF FEMALE, ESTROGEN RECEPTOR POSITIVE: ICD-10-CM

## 2023-07-05 PROCEDURE — 77063 BREAST TOMOSYNTHESIS BI: CPT | Performed by: INTERNAL MEDICINE

## 2023-07-05 PROCEDURE — 77067 SCR MAMMO BI INCL CAD: CPT | Performed by: INTERNAL MEDICINE

## 2023-07-28 ENCOUNTER — TELEPHONE (OUTPATIENT)
Dept: HEMATOLOGY/ONCOLOGY | Facility: HOSPITAL | Age: 58
End: 2023-07-28

## 2023-07-28 NOTE — TELEPHONE ENCOUNTER
----- Message from Jayson Garber RN sent at 7/5/2023  1:28 PM CDT -----  Regarding: F/U  Can we please schedule this pt to see [facility] for follow up? Thanks!       James Castro

## 2023-07-31 ENCOUNTER — TELEPHONE (OUTPATIENT)
Dept: HEMATOLOGY/ONCOLOGY | Facility: HOSPITAL | Age: 58
End: 2023-07-31

## 2023-07-31 NOTE — TELEPHONE ENCOUNTER
----- Message from Margaret Oneill, RN sent at 7/5/2023  1:28 PM CDT -----  Regarding: F/U  Can we please schedule this pt to see [facility] for follow up? Thanks!       Taylor Osuna

## 2023-08-04 ENCOUNTER — TELEPHONE (OUTPATIENT)
Dept: HEMATOLOGY/ONCOLOGY | Facility: HOSPITAL | Age: 58
End: 2023-08-04

## 2023-08-04 NOTE — TELEPHONE ENCOUNTER
Spoke with Malvin Baxter. She stated that she is no longer taking the anastrozole, Dr Kizzy Cruz took her off of it a year ago. She will continue to get her mammograms with her primary.

## 2023-08-05 ENCOUNTER — OFFICE VISIT (OUTPATIENT)
Dept: INTERNAL MEDICINE CLINIC | Facility: CLINIC | Age: 58
End: 2023-08-05
Payer: COMMERCIAL

## 2023-08-05 VITALS
SYSTOLIC BLOOD PRESSURE: 132 MMHG | TEMPERATURE: 97 F | OXYGEN SATURATION: 100 % | WEIGHT: 200 LBS | DIASTOLIC BLOOD PRESSURE: 84 MMHG | BODY MASS INDEX: 33 KG/M2 | HEART RATE: 68 BPM

## 2023-08-05 DIAGNOSIS — Z87.2 HISTORY OF PSORIASIS: ICD-10-CM

## 2023-08-05 DIAGNOSIS — Z86.19 HISTORY OF HEPATITIS C: Primary | ICD-10-CM

## 2023-08-05 DIAGNOSIS — D18.03 HEMANGIOMA OF LIVER: ICD-10-CM

## 2023-08-05 DIAGNOSIS — E78.5 DYSLIPIDEMIA: ICD-10-CM

## 2023-08-05 DIAGNOSIS — R73.03 PREDIABETES: ICD-10-CM

## 2023-08-05 PROCEDURE — 99214 OFFICE O/P EST MOD 30 MIN: CPT | Performed by: INTERNAL MEDICINE

## 2023-08-05 PROCEDURE — 3079F DIAST BP 80-89 MM HG: CPT | Performed by: INTERNAL MEDICINE

## 2023-08-05 PROCEDURE — 3075F SYST BP GE 130 - 139MM HG: CPT | Performed by: INTERNAL MEDICINE

## 2023-08-05 RX ORDER — ATORVASTATIN CALCIUM 10 MG/1
10 TABLET, FILM COATED ORAL NIGHTLY
Qty: 90 TABLET | Refills: 3 | Status: SHIPPED | OUTPATIENT
Start: 2023-08-05

## 2023-08-21 ENCOUNTER — LAB ENCOUNTER (OUTPATIENT)
Dept: LAB | Facility: HOSPITAL | Age: 58
End: 2023-08-21
Attending: INTERNAL MEDICINE
Payer: COMMERCIAL

## 2023-08-21 DIAGNOSIS — E21.3 HYPERPARATHYROIDISM (HCC): ICD-10-CM

## 2023-08-21 LAB — CALCIUM BLD-MCNC: 10 MG/DL (ref 8.5–10.1)

## 2023-08-21 PROCEDURE — 82310 ASSAY OF CALCIUM: CPT

## 2023-08-21 PROCEDURE — 36415 COLL VENOUS BLD VENIPUNCTURE: CPT

## 2023-08-22 DIAGNOSIS — R79.89 HIGH SERUM CALCIUM: Primary | ICD-10-CM

## 2023-09-06 ENCOUNTER — HOSPITAL ENCOUNTER (OUTPATIENT)
Dept: CT IMAGING | Facility: HOSPITAL | Age: 58
Discharge: HOME | End: 2023-09-06
Attending: INTERNAL MEDICINE

## 2023-09-06 VITALS
DIASTOLIC BLOOD PRESSURE: 70 MMHG | BODY MASS INDEX: 32.82 KG/M2 | SYSTOLIC BLOOD PRESSURE: 104 MMHG | WEIGHT: 197 LBS | HEIGHT: 65 IN

## 2023-09-06 DIAGNOSIS — Z13.6 SCREENING FOR CARDIOVASCULAR CONDITION: ICD-10-CM

## 2023-09-06 LAB
POCT GLUCOSE CHOLESTECH: 177 (ref 70–99)
POCT HDL: 30 (ref 40–60)
POCT LDL: 39 (ref 0–99)
POCT TOTAL CHOLESTEROL: 118 (ref 110–200)
POCT TRIGLYCERIDES: 244 (ref 1–149)

## 2023-09-09 NOTE — TELEPHONE ENCOUNTER
LOV;06/19/23    RTC;9months    FU; No FU    Pending Monthly Supply;order pending, approve if appropriate.

## 2023-09-10 RX ORDER — ALENDRONATE SODIUM 70 MG/1
70 TABLET ORAL
Qty: 12 TABLET | Refills: 0 | Status: SHIPPED | OUTPATIENT
Start: 2023-09-10

## 2023-10-23 ENCOUNTER — TELEPHONE (OUTPATIENT)
Dept: DERMATOLOGY CLINIC | Facility: CLINIC | Age: 58
End: 2023-10-23

## 2023-10-27 ENCOUNTER — OFFICE VISIT (OUTPATIENT)
Dept: INTERNAL MEDICINE CLINIC | Facility: CLINIC | Age: 58
End: 2023-10-27

## 2023-10-27 ENCOUNTER — LAB ENCOUNTER (OUTPATIENT)
Dept: LAB | Facility: HOSPITAL | Age: 58
End: 2023-10-27
Attending: INTERNAL MEDICINE

## 2023-10-27 VITALS
WEIGHT: 198 LBS | BODY MASS INDEX: 32.99 KG/M2 | HEIGHT: 65 IN | HEART RATE: 88 BPM | SYSTOLIC BLOOD PRESSURE: 110 MMHG | TEMPERATURE: 98 F | OXYGEN SATURATION: 96 % | DIASTOLIC BLOOD PRESSURE: 74 MMHG

## 2023-10-27 DIAGNOSIS — D18.03 HEMANGIOMA OF LIVER: Primary | ICD-10-CM

## 2023-10-27 DIAGNOSIS — Z85.3 HISTORY OF BREAST CANCER: ICD-10-CM

## 2023-10-27 DIAGNOSIS — E78.5 DYSLIPIDEMIA: ICD-10-CM

## 2023-10-27 DIAGNOSIS — Z86.19 HISTORY OF HEPATITIS C: ICD-10-CM

## 2023-10-27 DIAGNOSIS — Z87.2 HISTORY OF PSORIASIS: ICD-10-CM

## 2023-10-27 DIAGNOSIS — E21.3 HYPERPARATHYROIDISM (HCC): ICD-10-CM

## 2023-10-27 DIAGNOSIS — R73.03 PREDIABETES: ICD-10-CM

## 2023-10-27 DIAGNOSIS — R79.89 HIGH SERUM CALCIUM: ICD-10-CM

## 2023-10-27 DIAGNOSIS — E83.52 HYPERCALCEMIA: ICD-10-CM

## 2023-10-27 PROBLEM — N95.1 MENOPAUSAL SYMPTOMS: Status: RESOLVED | Noted: 2017-08-30 | Resolved: 2023-10-27

## 2023-10-27 LAB — CALCIUM BLD-MCNC: 9.9 MG/DL (ref 8.5–10.1)

## 2023-10-27 PROCEDURE — 82310 ASSAY OF CALCIUM: CPT

## 2023-10-27 PROCEDURE — 3008F BODY MASS INDEX DOCD: CPT | Performed by: INTERNAL MEDICINE

## 2023-10-27 PROCEDURE — 99214 OFFICE O/P EST MOD 30 MIN: CPT | Performed by: INTERNAL MEDICINE

## 2023-10-27 PROCEDURE — 36415 COLL VENOUS BLD VENIPUNCTURE: CPT

## 2023-10-27 PROCEDURE — 3074F SYST BP LT 130 MM HG: CPT | Performed by: INTERNAL MEDICINE

## 2023-10-27 PROCEDURE — 3078F DIAST BP <80 MM HG: CPT | Performed by: INTERNAL MEDICINE

## 2023-10-27 RX ORDER — ATORVASTATIN CALCIUM 10 MG/1
10 TABLET, FILM COATED ORAL NIGHTLY
Qty: 90 TABLET | Refills: 3 | Status: SHIPPED | OUTPATIENT
Start: 2023-10-27

## 2023-12-04 NOTE — TELEPHONE ENCOUNTER
LOV: 6/19/23    RTC: 9  Months    FU: No FU Appt Scheduled    Last Refill: 9/10/23    Called patient to help schedule a follow up appointment, no answer, lmtcb.  [facility] sent

## 2023-12-05 RX ORDER — ALENDRONATE SODIUM 70 MG/1
70 TABLET ORAL
Qty: 12 TABLET | Refills: 0 | Status: SHIPPED | OUTPATIENT
Start: 2023-12-05

## 2023-12-05 NOTE — TELEPHONE ENCOUNTER
Pt scheduled for follow   Future Appointments   Date Time Provider Afshan Avitia   2/5/2024  2:15 PM Burt Jaimes MD [facility]   3/2/2024 10:20 AM Cami Mahan MD [address] [place]     Pt asking if any labs need to be completed prior to office visit?  Please advise on refill request

## 2024-02-05 ENCOUNTER — LAB ENCOUNTER (OUTPATIENT)
Dept: LAB | Facility: HOSPITAL | Age: 59
End: 2024-02-05
Attending: INTERNAL MEDICINE
Payer: COMMERCIAL

## 2024-02-05 ENCOUNTER — OFFICE VISIT (OUTPATIENT)
Dept: ENDOCRINOLOGY CLINIC | Facility: CLINIC | Age: 59
End: 2024-02-05
Payer: COMMERCIAL

## 2024-02-05 VITALS
SYSTOLIC BLOOD PRESSURE: 122 MMHG | HEART RATE: 89 BPM | DIASTOLIC BLOOD PRESSURE: 79 MMHG | WEIGHT: 208 LBS | HEIGHT: 65 IN | BODY MASS INDEX: 34.66 KG/M2

## 2024-02-05 DIAGNOSIS — E55.9 VITAMIN D DEFICIENCY: ICD-10-CM

## 2024-02-05 DIAGNOSIS — M85.80 OSTEOPENIA, UNSPECIFIED LOCATION: Primary | ICD-10-CM

## 2024-02-05 DIAGNOSIS — E21.0 HYPERPARATHYROIDISM, PRIMARY (HCC): ICD-10-CM

## 2024-02-05 DIAGNOSIS — M85.80 OSTEOPENIA, UNSPECIFIED LOCATION: ICD-10-CM

## 2024-02-05 LAB
ALBUMIN SERPL-MCNC: 4.9 G/DL (ref 3.2–4.8)
ALBUMIN/GLOB SERPL: 1.8 {RATIO} (ref 1–2)
ALP LIVER SERPL-CCNC: 37 U/L
ALT SERPL-CCNC: 17 U/L
ANION GAP SERPL CALC-SCNC: 9 MMOL/L (ref 0–18)
AST SERPL-CCNC: 18 U/L (ref ?–34)
BILIRUB SERPL-MCNC: 0.6 MG/DL (ref 0.2–1.1)
BUN BLD-MCNC: 15 MG/DL (ref 9–23)
BUN/CREAT SERPL: 20.3 (ref 10–20)
CALCIUM BLD-MCNC: 10.1 MG/DL (ref 8.7–10.4)
CHLORIDE SERPL-SCNC: 108 MMOL/L (ref 98–112)
CO2 SERPL-SCNC: 24 MMOL/L (ref 21–32)
CREAT BLD-MCNC: 0.74 MG/DL
EGFRCR SERPLBLD CKD-EPI 2021: 93 ML/MIN/1.73M2 (ref 60–?)
FASTING STATUS PATIENT QL REPORTED: NO
GLOBULIN PLAS-MCNC: 2.8 G/DL (ref 2.8–4.4)
GLUCOSE BLD-MCNC: 117 MG/DL (ref 70–99)
OSMOLALITY SERPL CALC.SUM OF ELEC: 294 MOSM/KG (ref 275–295)
POTASSIUM SERPL-SCNC: 4 MMOL/L (ref 3.5–5.1)
PROT SERPL-MCNC: 7.7 G/DL (ref 5.7–8.2)
PTH-INTACT SERPL-MCNC: 167.2 PG/ML (ref 18.5–88)
SODIUM SERPL-SCNC: 141 MMOL/L (ref 136–145)
VIT D+METAB SERPL-MCNC: 47 NG/ML (ref 30–100)

## 2024-02-05 PROCEDURE — 36415 COLL VENOUS BLD VENIPUNCTURE: CPT

## 2024-02-05 PROCEDURE — 83970 ASSAY OF PARATHORMONE: CPT

## 2024-02-05 PROCEDURE — 80053 COMPREHEN METABOLIC PANEL: CPT

## 2024-02-05 PROCEDURE — 84080 ASSAY ALKALINE PHOSPHATASES: CPT

## 2024-02-05 PROCEDURE — 82306 VITAMIN D 25 HYDROXY: CPT

## 2024-02-05 PROCEDURE — 99214 OFFICE O/P EST MOD 30 MIN: CPT | Performed by: INTERNAL MEDICINE

## 2024-02-05 RX ORDER — ALENDRONATE SODIUM 70 MG/1
70 TABLET ORAL
Qty: 12 TABLET | Refills: 2 | Status: SHIPPED | OUTPATIENT
Start: 2024-02-05

## 2024-02-05 NOTE — PROGRESS NOTES
FU VISIT:     CHIEF COMPLAINT:    Hyperparathyroidism   Osteopenia    HISTORY OF PRESENT ILLNESS:   Mary Tovar is a 58 year old female who presents for evaluation of high PTH level and osteopenia    Noted on blood work on two occassions  Calcium elevated / high normal. Highest calcium 10.8     Vit D is low and she is on replacement   On OTC vitamin D 500 units daily       No h/o renal stones   No falls, no fractures  DXA May 2021:   Osteopenia with increased fracture risk.   2. When compared to baseline the left hip has gone from 1.122 to 1.034 a  loss of  7.8 %  and the AP lumbar has gone from 1.022 to 0.81 a loss of 13.8 %    DXA 2023:   1. Osteopenia with increased fracture risk.   2. When compared to baseline the left hip has gone from 1.122 to 1.010 a  loss of  10.0 %  and the AP lumbar has gone from 1.022 to 3.860 a loss of 15.9 %.       10 year Fracture Risk:   Major Osteoporotic Fracture:  6.2 %.   Hip Fracture:  0.2 %.    No known FH of high calcium      H/o breast cancer in , history of being on tamoxifen and  Anastrazole    PAST MEDICAL HISTORY:   Past Medical History:   Diagnosis Date    Anxiety 4/10/2018    Breast cancer (HCC)     left     Breast cancer of lower-outer quadrant of left female breast (HCC) 2012    Breast cancer of upper-outer quadrant of left female breast (HCC) 2012    Breast lump 2008    BIOPSY    Breast mass, left 2010    per NG: US GUIDED FNA OF MASS IN THE LEFT BREAST    Dysplasia of cervix     LEE    Encounter for monitoring aromatase inhibitor therapy 4/10/2018    Hepatitis C 3/16/15    Dr. Jamilah Ivory - RNA PCR not det 6/13/15    History of pregnancy     per NG:     History of psoriasis 2020    Infiltrating ductal carcinoma of breast, stage 2 (HCC)     per NG: infiltrating ductal carcinoma grade 2    Malignant neoplasm of breast (female), unspecified site 2012    2010:  lumpectomy     Malignant neoplasm of lower-outer  quadrant of left breast of female, estrogen receptor positive  (HCC) 2012    Osteoporosis screening 2014    DEXA SCAN    Ruptured appendix     per NG: appendectomy       PAST SURGICAL HISTORY:   Past Surgical History:   Procedure Laterality Date    APPENDECTOMY      per NG: REMOVED PART OF LARGE BOWEL    BREAST BIOPSY Left 2010    US guided biopsy left breast    CHEMOTHERAPY      COLON SURGERY      14 inch of lower intestine    HYSTERECTOMY  2006    total except for ovaries    LEEP      LUMPECTOMY LEFT Left     UOQ    LUMPECTOMY LEFT Left 2010    UOQ    LUMPECTOMY LEFT Left 2010    left breast re-excision and left axillary node dissection    LYSIS OF ADHESIONS  2002    per NG: CHRISTIN /JL          per N week 6 lb(s) 12 oz Female    RADIATION LEFT      TOTAL ABDOMINAL HYSTERECTOMY  2002    per NG: CHRISTIN /JL    US FNA LYMPH NODE, GUIDE INCLD,  LEFT (CPT=10005) Left 2010    Wide excision and sentinal node        CURRENT MEDICATIONS:    Current Outpatient Medications   Medication Sig Dispense Refill    alendronate 70 MG Oral Tab Take 1 tablet (70 mg total) by mouth every 7 days. 12 tablet 0    atorvastatin 10 MG Oral Tab Take 1 tablet (10 mg total) by mouth nightly. 90 tablet 3    metFORMIN 500 MG Oral Tab Take 1 tablet (500 mg total) by mouth 2 (two) times daily with meals. 180 tablet 0    ketoconazole 2 % External Shampoo Apply to scalp 2 times per week. 120 mL 11    clobetasol 0.05 % External Solution Use bid  As directed to scalp 50 mL 6    triamcinolone 0.5 % External Cream Use bid as directed to psoriasis on neck, around ears and face 60 g 1       ALLERGIES:  No Known Allergies    SOCIAL HISTORY:    Social History     Socioeconomic History    Marital status:    Tobacco Use    Smoking status: Never    Smokeless tobacco: Never   Substance and Sexual Activity    Alcohol use: Yes     Alcohol/week: 0.8 standard drinks of alcohol     Types: 1 Glasses  of wine per week     Comment: rarely    Drug use: No   Other Topics Concern    Caffeine Concern Yes     Comment: 24 oz of coffee daily, per NG: soda    Reaction to local anesthetic No       FAMILY HISTORY:   Family History   Problem Relation Age of Onset    Diabetes Father     Hypertension Father     Cancer Mother         brain    Heart Disorder Mother     Cancer Maternal Grandfather     Cancer Paternal Grandfather     Breast Cancer Self 45        left       ASSESSMENTS:     REVIEW OF SYSTEMS:  Constitutional: Negative for: weight change, fever, fatigue, cold/heat intolerance  Eyes: Negative for:  Visual changes, proptosis, blurring  ENT: Negative for:  dysphagia, neck swelling, dysphonia  Respiratory: Negative for:  dyspnea, cough  Cardiovascular: Negative for:  chest pain, palpitations, orthopnea  GI: Negative for:  abdominal pain, nausea, vomiting, diarrhea, constipation, bleeding  Neurology: Negative for: headache, numbness, weakness  Genito-Urinary: Negative for: dysuria, frequency  Psychiatric: Negative for:  depression, anxiety  Hematology/Lymphatics: Negative for: bruising, lower extremity edema  Endocrine: Negative for: polyuria, polydypsia  Skin: Negative for: rash, blister, cellulitis,      PHYSICAL EXAM:   Vitals:    02/05/24 1411   BP: 122/79   Pulse: 89   Weight: 208 lb (94.3 kg)   Height: 5' 5\" (1.651 m)     BMI: Body mass index is 34.61 kg/m².         General Appearance:  alert, well developed, in no acute distress  Head: Atraumatic  Eyes:  normal conjunctivae, sclera., normal sclera and normal pupils  Throat/Neck: normal sound to voice. Normal hearing, normal speech  Respiratory:  Speaking in full sentences, non-labored. no increased work of breathing, no audible wheezing    Neuro: motor grossly intact, moving all extremities without difficulty  Psychiatric:  oriented to time, self, and place  Extremities: no obvious extremity swelling, no lesions      DATA:     Pertinent data  reviewed      ASSESSMENT AND PLAN:    Patient is a 58  year old with:     1. Primary hyperparathyroidism.   Highest calcium 10.8  Calcium has been stable  Clinically no symptoms of high calcium  DXA: osteopenia, with worsening BMD , but 10 year fracture risk is not high    I reviewed the following with her:     - Calcium regulation  - Role of PTH and vitamin D in calcium regulation  - Primary hyperparathyroidism including causes adenoma vs hyperplasia  - Indications for treatment of hyperparathyroidism ( NM parathyroid scan to localize source following by surgery)  > Symptoms of hypercalcemia  > CKD  > Osteoporosis  > Renal stones / high urine calcium:   > patient preference  > Age under 60  > Serum calcium > 1 x ULN    After a discussion she has opted for monitoring  Discussed monitoring with calcium, PTH , Vitamin D  Reviewed recent labs, will repeat calcium, vitamin D today and decide on vitamin D replacement accordingly  Reviewed symptoms of renal stones. Patient instructed to avoid factors that cam exacerbate hypercalcemia including thiazide diuretics, dehydration, prolonged bed rest.        Instructed that she shouldn't restrict calcium in her diet, since low calcium levels can increase PTH levels which could cause bone loss. Discussed RDI.    PLAN:  1. Labs as below  2 To call if she has symptoms of hypercalcemia  3. Bone health: will check vitamin D, monitor DXA ( she has order). Fall precautions      2. Osteopenia  Discussed osteopenia and osteoporosis in detail including pathogenesis, diagnosis and management.   Osteopenia is present when the BMD is between 1.0 and 2.5 SDs below bone density of young healthy individuals. More than 50% of fragility fractures occur in these patients. Osteoporosis is defined as a BMD?-2.5 SDs of young normal, healthy individuals.  If the 10-year absolute fracture risk is ?3% for hip fractures or ?20% for other major osteoporotic fractures, pharmacologic therapy should be  considered    I have reviewed DXA In detail . Last DXA ( June 2023)  shows osteopenia with significant bone loss as compared to the DXA that was done before. She has a h/o being on tamoxifen / anastrazole for many years ( although stopped last year). She also has a high PTH level that increase risk of bone loss.     Given this, we started fosamax in June 2023    I reviewed fosamax  Contraindications:   Abnormalities of the esophagus which delay emptying such as   stricture or achalasia  Inability to stand/sit upright for at least 30 minutes  Patients at increased risk of aspiration  Hypocalcemia   Hypersensitivity to any component of this product     Warnings:   Severe irritation of upper gastrointestinal (GI) mucosa can occur.   Hypocalcemia can worsen and must be corrected prior to use.  Severe bone, joint, muscle pain may occur. Discontinue use if   severe symptoms develop.   Osteonecrosis of the jaw has been reported.   Atypical femur fractures have been reported. Contact the clinic if you develop new thigh or   groin pain to rule out an incomplete femoral fracture.     Adverse reactions:   Most common adverse reactions (?3%) are abdominal pain, acid   regurgitation, constipation, diarrhea, dyspepsia, musculoskeletal pain,   nausea.     Administration:   Must be taken with 6-8 oz plain water at least 30 minutes before the   first food, drink, or medication of the day; do not lie down for at least   30 minutes after taking FOSAMAX and until after food.    C/w fosamax 70 mg weekly   Labs today   Administration reviewed       I also reviewed dietary changes    Diet: Eat foods with high calcium: milk with vitamin D added, fish from the ocean, yogurt, green leafy vegetables  Exercise:  lifelong physical activity at all ages is strongly endorsed by the National Osteoporosis Foundation. Exercise recommendations generally should include weight-bearing, muscle-strengthening, and balance training exercises for 30 minutes  5 days per week or 75 minutes twice weekly, often consistent with other general health recommendations.     Patient verbalized a complete  understanding of all of the above and did not have any further questions.     RTC in 9 months  Call for results        Orders Placed This Encounter   Procedures    Comp Metabolic Panel [E]    Alk Phosphatase, Bone Specific    PTH, Intact    Vitamin D [E]         Tiffany León MD

## 2024-02-06 DIAGNOSIS — R79.89 HIGH SERUM PARATHYROID HORMONE (PTH): Primary | ICD-10-CM

## 2024-02-09 LAB — ALKALINE PHOSPHATASE BONE SPECIFIC: 6.3 UG/L

## 2024-02-16 ENCOUNTER — HOSPITAL ENCOUNTER (OUTPATIENT)
Dept: ULTRASOUND IMAGING | Age: 59
Discharge: HOME | End: 2024-02-16
Attending: INTERNAL MEDICINE
Payer: COMMERCIAL

## 2024-02-16 DIAGNOSIS — Z86.19 HISTORY OF HEPATITIS C: ICD-10-CM

## 2024-02-16 DIAGNOSIS — D18.03 HEMANGIOMA OF LIVER: ICD-10-CM

## 2024-02-16 PROCEDURE — 76705 ECHO EXAM OF ABDOMEN: CPT | Performed by: INTERNAL MEDICINE

## 2024-03-16 ENCOUNTER — OFFICE VISIT (OUTPATIENT)
Dept: INTERNAL MEDICINE CLINIC | Facility: CLINIC | Age: 59
End: 2024-03-16
Payer: COMMERCIAL

## 2024-03-16 VITALS
DIASTOLIC BLOOD PRESSURE: 78 MMHG | OXYGEN SATURATION: 99 % | BODY MASS INDEX: 34.66 KG/M2 | WEIGHT: 208 LBS | HEART RATE: 74 BPM | SYSTOLIC BLOOD PRESSURE: 110 MMHG | HEIGHT: 65 IN

## 2024-03-16 DIAGNOSIS — D18.03 HEMANGIOMA OF LIVER: Primary | ICD-10-CM

## 2024-03-16 DIAGNOSIS — R73.03 PREDIABETES: ICD-10-CM

## 2024-03-16 DIAGNOSIS — E21.3 HYPERPARATHYROIDISM (HCC): ICD-10-CM

## 2024-03-16 DIAGNOSIS — Z86.19 HISTORY OF HEPATITIS C: ICD-10-CM

## 2024-03-16 DIAGNOSIS — Z12.31 BREAST CANCER SCREENING BY MAMMOGRAM: ICD-10-CM

## 2024-03-16 LAB
CARTRIDGE LOT#: 660 NUMERIC
HEMOGLOBIN A1C: 6.6 % (ref 4.3–5.6)

## 2024-03-16 PROCEDURE — 99214 OFFICE O/P EST MOD 30 MIN: CPT | Performed by: INTERNAL MEDICINE

## 2024-03-16 PROCEDURE — 83036 HEMOGLOBIN GLYCOSYLATED A1C: CPT | Performed by: INTERNAL MEDICINE

## 2024-06-01 ENCOUNTER — LAB ENCOUNTER (OUTPATIENT)
Dept: LAB | Facility: HOSPITAL | Age: 59
End: 2024-06-01
Attending: INTERNAL MEDICINE
Payer: COMMERCIAL

## 2024-06-01 ENCOUNTER — OFFICE VISIT (OUTPATIENT)
Dept: INTERNAL MEDICINE CLINIC | Facility: CLINIC | Age: 59
End: 2024-06-01
Payer: COMMERCIAL

## 2024-06-01 VITALS
BODY MASS INDEX: 33.15 KG/M2 | DIASTOLIC BLOOD PRESSURE: 70 MMHG | WEIGHT: 199 LBS | SYSTOLIC BLOOD PRESSURE: 100 MMHG | OXYGEN SATURATION: 99 % | HEIGHT: 65 IN | HEART RATE: 68 BPM

## 2024-06-01 DIAGNOSIS — E11.9 TYPE 2 DIABETES MELLITUS WITHOUT COMPLICATION, WITHOUT LONG-TERM CURRENT USE OF INSULIN (HCC): Primary | ICD-10-CM

## 2024-06-01 DIAGNOSIS — Z86.19 HISTORY OF HEPATITIS C: ICD-10-CM

## 2024-06-01 DIAGNOSIS — Z87.2 HISTORY OF PSORIASIS: ICD-10-CM

## 2024-06-01 DIAGNOSIS — E21.3 HYPERPARATHYROIDISM (HCC): ICD-10-CM

## 2024-06-01 DIAGNOSIS — E11.9 TYPE 2 DIABETES MELLITUS WITHOUT COMPLICATION, WITHOUT LONG-TERM CURRENT USE OF INSULIN (HCC): ICD-10-CM

## 2024-06-01 LAB
ALBUMIN SERPL-MCNC: 5 G/DL (ref 3.2–4.8)
ALBUMIN/GLOB SERPL: 2.2 {RATIO} (ref 1–2)
ALP LIVER SERPL-CCNC: 36 U/L
ALT SERPL-CCNC: 12 U/L
ANION GAP SERPL CALC-SCNC: 7 MMOL/L (ref 0–18)
AST SERPL-CCNC: 13 U/L (ref ?–34)
BILIRUB SERPL-MCNC: 0.5 MG/DL (ref 0.2–1.1)
BUN BLD-MCNC: 19 MG/DL (ref 9–23)
BUN/CREAT SERPL: 27.5 (ref 10–20)
CALCIUM BLD-MCNC: 9.7 MG/DL (ref 8.7–10.4)
CHLORIDE SERPL-SCNC: 109 MMOL/L (ref 98–112)
CHOLEST SERPL-MCNC: 120 MG/DL (ref ?–200)
CO2 SERPL-SCNC: 27 MMOL/L (ref 21–32)
CREAT BLD-MCNC: 0.69 MG/DL
CREAT UR-SCNC: 144.7 MG/DL
EGFRCR SERPLBLD CKD-EPI 2021: 99 ML/MIN/1.73M2 (ref 60–?)
FASTING PATIENT LIPID ANSWER: YES
FASTING STATUS PATIENT QL REPORTED: YES
GLOBULIN PLAS-MCNC: 2.3 G/DL (ref 2–3.5)
GLUCOSE BLD-MCNC: 121 MG/DL (ref 70–99)
HDLC SERPL-MCNC: 42 MG/DL (ref 40–59)
HEMOGLOBIN A1C: 5.7 % (ref 4.3–5.6)
LDLC SERPL CALC-MCNC: 61 MG/DL (ref ?–100)
MICROALBUMIN UR-MCNC: 0.7 MG/DL
MICROALBUMIN/CREAT 24H UR-RTO: 4.8 UG/MG (ref ?–30)
NONHDLC SERPL-MCNC: 78 MG/DL (ref ?–130)
OSMOLALITY SERPL CALC.SUM OF ELEC: 300 MOSM/KG (ref 275–295)
POTASSIUM SERPL-SCNC: 4 MMOL/L (ref 3.5–5.1)
PROT SERPL-MCNC: 7.3 G/DL (ref 5.7–8.2)
SODIUM SERPL-SCNC: 143 MMOL/L (ref 136–145)
TRIGL SERPL-MCNC: 88 MG/DL (ref 30–149)
VLDLC SERPL CALC-MCNC: 13 MG/DL (ref 0–30)

## 2024-06-01 PROCEDURE — 80061 LIPID PANEL: CPT

## 2024-06-01 PROCEDURE — 99214 OFFICE O/P EST MOD 30 MIN: CPT | Performed by: INTERNAL MEDICINE

## 2024-06-01 PROCEDURE — 82570 ASSAY OF URINE CREATININE: CPT

## 2024-06-01 PROCEDURE — 82043 UR ALBUMIN QUANTITATIVE: CPT

## 2024-06-01 PROCEDURE — 83036 HEMOGLOBIN GLYCOSYLATED A1C: CPT | Performed by: INTERNAL MEDICINE

## 2024-06-01 PROCEDURE — 36415 COLL VENOUS BLD VENIPUNCTURE: CPT

## 2024-06-01 PROCEDURE — 80053 COMPREHEN METABOLIC PANEL: CPT

## 2024-06-01 RX ORDER — TRIAMCINOLONE ACETONIDE 5 MG/G
CREAM TOPICAL
Qty: 60 G | Refills: 1 | Status: SHIPPED | OUTPATIENT
Start: 2024-06-01

## 2024-06-01 RX ORDER — ALENDRONATE SODIUM 70 MG/1
70 TABLET ORAL
Qty: 12 TABLET | Refills: 2 | Status: SHIPPED | OUTPATIENT
Start: 2024-06-01

## 2024-06-01 RX ORDER — ATORVASTATIN CALCIUM 10 MG/1
10 TABLET, FILM COATED ORAL NIGHTLY
Qty: 90 TABLET | Refills: 3 | Status: SHIPPED | OUTPATIENT
Start: 2024-06-01

## 2024-06-01 RX ORDER — CLOBETASOL PROPIONATE 0.46 MG/ML
SOLUTION TOPICAL
Qty: 50 ML | Refills: 6 | Status: SHIPPED | OUTPATIENT
Start: 2024-06-01

## 2024-06-01 RX ORDER — KETOCONAZOLE 20 MG/ML
SHAMPOO TOPICAL
Qty: 120 ML | Refills: 11 | Status: SHIPPED | OUTPATIENT
Start: 2024-06-01

## 2024-06-01 NOTE — PROGRESS NOTES
Mary Tovar female 58 year old         Chief complaint DM     Has been working hard - ezekial bread  -  low sugar -  lost  9 lbs              Patient Active Problem List   Diagnosis    History of breast cancer    History of hepatitis C    Vitamin D deficiency    History of psoriasis    Dyslipidemia    Prediabetes          No current outpatient medications on file prior to visit.     No current facility-administered medications on file prior to visit.          Vitals:    06/01/24 0941   BP: 100/70   Pulse: 68   VITALSBody mass index is 33.12 kg/m².    Pertinent findings on the physical exam; looks great  -  cor reg  Bilateral barefoote appearance is normal.  Bilateral monofilament/sensation of both feet is normal.  Pedal pulse exam of both lower feet is normal.      Mary was seen today for pre-diabetes.    Diagnoses and all orders for this visit:    Type 2 diabetes mellitus without complication, without long-term current use of insulin (HCC)  -     Hemoglobin A1C; Future  -     Microalb/Creat Ratio, Random Urine; Future  -     Comp Metabolic Panel (14); Future  -     Lipid Panel; Future    Other orders  -     clobetasol 0.05 % External Solution; Use bid  As directed to scalp  -     ketoconazole 2 % External Shampoo; Apply to scalp 2 times per week.  -     triamcinolone 0.5 % External Cream; Use bid as directed to psoriasis on neck, around ears and face  -     metFORMIN 500 MG Oral Tab; Take 1 tablet (500 mg total) by mouth 2 (two) times daily with meals.  -     atorvastatin 10 MG Oral Tab; Take 1 tablet (10 mg total) by mouth nightly.  -     alendronate 70 MG Oral Tab; Take 1 tablet (70 mg total) by mouth every 7 days.         Dm  control  great  with diet  and metformin       Continue  current  plan       Discussed  psoriasis and  freq steroid use -  - needs to keep  steroid use on face  limited      Check  calcium        Check urine for protein     Check LFT for  hx of hepatitis      Discussed gregg  score in past  0      See optho      This note was prepared using Dragon Medical voice recognition dictation software and as a result, errors may occur. When identified, these errors have been corrected. While every attempt is made to correct errors during dictation, discrepancies may still exist

## 2024-07-06 ENCOUNTER — HOSPITAL ENCOUNTER (OUTPATIENT)
Dept: MAMMOGRAPHY | Age: 59
Discharge: HOME | End: 2024-07-06
Attending: INTERNAL MEDICINE
Payer: COMMERCIAL

## 2024-07-06 DIAGNOSIS — Z12.31 BREAST CANCER SCREENING BY MAMMOGRAM: ICD-10-CM

## 2024-07-06 PROCEDURE — 77063 BREAST TOMOSYNTHESIS BI: CPT | Performed by: INTERNAL MEDICINE

## 2024-07-06 PROCEDURE — 77067 SCR MAMMO BI INCL CAD: CPT | Performed by: INTERNAL MEDICINE

## 2024-11-02 ENCOUNTER — LAB ENCOUNTER (OUTPATIENT)
Dept: LAB | Facility: HOSPITAL | Age: 59
End: 2024-11-02
Attending: INTERNAL MEDICINE
Payer: COMMERCIAL

## 2024-11-02 ENCOUNTER — OFFICE VISIT (OUTPATIENT)
Dept: INTERNAL MEDICINE CLINIC | Facility: CLINIC | Age: 59
End: 2024-11-02
Payer: COMMERCIAL

## 2024-11-02 VITALS
SYSTOLIC BLOOD PRESSURE: 102 MMHG | DIASTOLIC BLOOD PRESSURE: 68 MMHG | HEIGHT: 65 IN | WEIGHT: 201.81 LBS | RESPIRATION RATE: 18 BRPM | HEART RATE: 83 BPM | OXYGEN SATURATION: 97 % | BODY MASS INDEX: 33.62 KG/M2

## 2024-11-02 DIAGNOSIS — E21.3 HYPERPARATHYROIDISM (HCC): ICD-10-CM

## 2024-11-02 DIAGNOSIS — Z86.19 HISTORY OF HEPATITIS C: ICD-10-CM

## 2024-11-02 DIAGNOSIS — Z12.11 COLON CANCER SCREENING: ICD-10-CM

## 2024-11-02 DIAGNOSIS — R79.89 HIGH SERUM PARATHYROID HORMONE (PTH): ICD-10-CM

## 2024-11-02 DIAGNOSIS — Z85.3 HISTORY OF BREAST CANCER: ICD-10-CM

## 2024-11-02 DIAGNOSIS — E11.9 TYPE 2 DIABETES MELLITUS WITHOUT COMPLICATION, WITHOUT LONG-TERM CURRENT USE OF INSULIN (HCC): Primary | ICD-10-CM

## 2024-11-02 LAB
CALCIUM BLD-MCNC: 10.1 MG/DL (ref 8.7–10.4)
HEMOGLOBIN A1C: 6 % (ref 4.3–5.6)

## 2024-11-02 PROCEDURE — 99214 OFFICE O/P EST MOD 30 MIN: CPT | Performed by: INTERNAL MEDICINE

## 2024-11-02 PROCEDURE — 82310 ASSAY OF CALCIUM: CPT

## 2024-11-02 PROCEDURE — 83036 HEMOGLOBIN GLYCOSYLATED A1C: CPT | Performed by: INTERNAL MEDICINE

## 2024-11-02 PROCEDURE — 36415 COLL VENOUS BLD VENIPUNCTURE: CPT

## 2024-11-02 RX ORDER — ALENDRONATE SODIUM 70 MG/1
70 TABLET ORAL
Qty: 12 TABLET | Refills: 3 | Status: SHIPPED | OUTPATIENT
Start: 2024-11-02

## 2024-11-02 RX ORDER — ATORVASTATIN CALCIUM 10 MG/1
10 TABLET, FILM COATED ORAL NIGHTLY
Qty: 90 TABLET | Refills: 3 | Status: SHIPPED | OUTPATIENT
Start: 2024-11-02

## 2024-11-02 NOTE — PROGRESS NOTES
Chief complaint and HPI      Mary Tovar  59 year old female   Chief Complaint   Patient presents with    Follow - Up     Chief Complaint: DM,  hx of hep  c        Discussed diabetes management ; diet and medications  -    Last A1c value was 5.7% done 6/1/2024.         Malb/Cre Calc   Date Value Ref Range Status   06/01/2024 4.8 <=30.0 ug/mg Final     Comment:     <30 ug/mg creatinine       Normal     ug/mg creatinine   Microalbuminuria   >300 ug/mg creatinine      Albuminuria         Lab Results   Component Value Date     (H) 06/01/2024    BUN 19 06/01/2024    CREATSERUM 0.69 06/01/2024    BUNCREA 27.5 (H) 06/01/2024    ANIONGAP 7 06/01/2024    GFRAA 110 06/17/2022    GFRNAA 96 06/17/2022    CA 9.7 06/01/2024     06/01/2024    K 4.0 06/01/2024     06/01/2024    CO2 27.0 06/01/2024    OSMOCALC 300 (H) 06/01/2024     Blood Sugar Medications            metFORMIN 500 MG Oral Tab                    Discussed lipid control for cardiac prevention.no issues    Lab Results   Component Value Date    CHOLEST 120 06/01/2024    TRIG 88 06/01/2024    HDL 42 06/01/2024    LDL 61 06/01/2024    VLDL 13 06/01/2024    TCHDLRATIO 4.3 06/11/2021    NONHDLC 78 06/01/2024     Cholesterol Lowering Medications            atorvastatin 10 MG Oral Tab          Had hep c in early 2015    treated  with harmoni   -  liver ult  no fibrosis or  cirrhosis  - in feb     rna undetectable  for may years      Last colon 2015  no polyps      Ros  no  cp or  sob no  GI or gu  or gyn issues  -  no depression         EXAM:     Vitals:    11/02/24 0834   BP: 102/68   Pulse: 83   Resp: 18   VITALSBody mass index is 33.58 kg/m².         Neuro ; normal     Neck ; no sig findings    Heart exam; regular     Lungs; no rhonchi or rales     Ext ; no edema     Mary was seen today for follow - up.    Diagnoses and all orders for this visit:    Type 2 diabetes mellitus without complication, without long-term current use of insulin  (HCC)    Colon cancer screening  -     GASTRO - INTERNAL    Hyperparathyroidism (HCC)    History of hepatitis C    History of breast cancer    Other orders  -     alendronate 70 MG Oral Tab; Take 1 tablet (70 mg total) by mouth every 7 days.  -     atorvastatin 10 MG Oral Tab; Take 1 tablet (10 mg total) by mouth nightly.  -     metFORMIN 500 MG Oral Tab; Take 1 tablet (500 mg total) by mouth 2 (two) times daily with meals.         DM  control discussed  -  cpm  watch diet      No complications       Keep on  fosamax for  primary hyparathyroid      Nop need for  hep c f/u    Last  jhon  nl  no  reocurrence  sxs        This note was prepared using Dragon Medical voice recognition dictation software and as a result, errors may occur. When identified, these errors have been corrected. While every attempt is made to correct errors during dictation, discrepancies may still exist       Ronak Mcbride MD

## 2024-12-17 RX ORDER — TRIAMCINOLONE ACETONIDE 5 MG/G
CREAM TOPICAL
Qty: 60 G | Refills: 1 | Status: SHIPPED | OUTPATIENT
Start: 2024-12-17

## 2025-01-29 ENCOUNTER — TELEPHONE (OUTPATIENT)
Facility: CLINIC | Age: 60
End: 2025-01-29

## 2025-01-29 ENCOUNTER — OFFICE VISIT (OUTPATIENT)
Dept: GASTROENTEROLOGY | Facility: CLINIC | Age: 60
End: 2025-01-29

## 2025-01-29 VITALS
WEIGHT: 201 LBS | HEIGHT: 65 IN | HEART RATE: 72 BPM | BODY MASS INDEX: 33.49 KG/M2 | SYSTOLIC BLOOD PRESSURE: 129 MMHG | DIASTOLIC BLOOD PRESSURE: 66 MMHG

## 2025-01-29 DIAGNOSIS — Z86.19 HISTORY OF HEPATITIS C: ICD-10-CM

## 2025-01-29 DIAGNOSIS — Z12.11 ENCOUNTER FOR SCREENING COLONOSCOPY: Primary | ICD-10-CM

## 2025-01-29 DIAGNOSIS — Z12.11 COLON CANCER SCREENING: Primary | ICD-10-CM

## 2025-01-29 PROCEDURE — S0285 CNSLT BEFORE SCREEN COLONOSC: HCPCS | Performed by: INTERNAL MEDICINE

## 2025-01-29 RX ORDER — POLYETHYLENE GLYCOL 3350, SODIUM CHLORIDE, SODIUM BICARBONATE, POTASSIUM CHLORIDE 420; 11.2; 5.72; 1.48 G/4L; G/4L; G/4L; G/4L
POWDER, FOR SOLUTION ORAL
Qty: 1 EACH | Refills: 0 | Status: SHIPPED | OUTPATIENT
Start: 2025-01-29

## 2025-01-29 NOTE — TELEPHONE ENCOUNTER
Scheduled for:  Colonoscopy [id number]  Provider Name:  Dr Engel  Date:  5/6/2025  Location:  [facility]  Sedation:  MAC  Time:  10:00 am. (pt is aware that [facility] will call the day before to confirm arrival time)  Prep:  Suprep  Meds/Allergies Reconciled?:  Physician Reviewed  Diagnosis with codes:    CRC screening Z12.11  Was patient informed to call insurance with codes (Y/N):  Yes  Referral sent?:  Referral was sent at the time of electronic surgical scheduling.  [facility] or [facility] notified?:  I sent an electronic request to Endo Scheduling and received a confirmation today.  Medication Orders:  Patient is aware to NOT take iron pills, herbal meds and diet supplements for 7 days before exam. Also to NOT take any form of alcohol, recreational drugs and any forms of ED meds 24-72 hours before exam.   Misc Orders:  N/A   Further instructions given by staff:  I discussed the prep instructions with the patient which she verbally understood. I provided patient with prep instruction's sheet in office.      Patient was informed about the new cancellation policy for his/her procedure. Patient was also given a copy of the cancellation policy at the time of the appointment and verbalized understanding.

## 2025-01-29 NOTE — H&P
[facility] - Gastroenterology                                                                                                  Clinic History and Physical     Chief Complaint   Patient presents with    Consult     Colonoscopy       HPI:   Mary Tovar is a 59 year old female, seen in consultation by request or Dr. Mcbride, with history of HepC s/p SVR , anxiety, total hysterectomy, hx of appendix rupture with resection of small and colon, breast CA , who presents for colon cancer screening evaluation.    No GI issues  US recently negative for scarring and stable liver cyst    Patient denies any GI symptoms of nausea, vomiting, dyspepsia, dysphagia, hematemesis, abdominal pain, change in bowel habits, thin stools, hematochezia, or melena.  Additionally there is no weight loss and no reported history of chest pain or shortness of breath.  -no family history of colon cancer.  -denies significant constipation issues.    denies adverse reaction to anesthesia    Prior endoscopies:      Soc:  - denies smoking  - denies etoh  - denies marijuana, denies other recreational drugs    History, Medications, Allergies, ROS:      Past Medical History:    Allergic rhinitis    Anxiety    Arthritis    Breast cancer (HCC)    left 2010    Breast cancer of lower-outer quadrant of left female breast (HCC)    Breast cancer of upper-outer quadrant of left female breast (HCC)    Breast lump    BIOPSY    Breast mass, left    per NG: US GUIDED FNA OF MASS IN THE LEFT BREAST    Dysplasia of cervix    LEEP    Encounter for monitoring aromatase inhibitor therapy    Hepatitis C    Dr. Jamilah Ivory - RNA PCR not det 6/13/15    History of pregnancy    per NG:     History of psoriasis    Infiltrating ductal carcinoma of breast, stage 2 (HCC)    per NG: infiltrating ductal carcinoma grade 2    Malignant neoplasm of breast (female),  unspecified site    2010:  lumpectomy     Malignant neoplasm of lower-outer quadrant of left breast of female, estrogen receptor positive (HCC)    Obesity    Osteoporosis screening    DEXA SCAN    Ruptured appendix    per NG: appendectomy      Past Surgical History:   Procedure Laterality Date    Appendectomy      per NG: REMOVED PART OF LARGE BOWEL    Breast biopsy Left 2010    US guided biopsy left breast    Chemotherapy      Colon surgery      14 inch of lower intestine    Hysterectomy  2006    total except for ovaries    Leep      Lumpectomy left Left     UOQ    Lumpectomy left Left 2010    UOQ    Lumpectomy left Left 2010    left breast re-excision and left axillary node dissection    Lysis of adhesions  2002    per NG: CHRISTIN /JL      1988    per N week 6 lb(s) 12 oz Female    Radiation left      Tonsillectomy  1971    Total abdominal hysterectomy  2002    per NG: CHRISTIN /JL    Us fna lymph node, guide incld,  left (cpt=10005) Left 2010    Wide excision and sentinal node       Family Hx:   Family History   Problem Relation Age of Onset    Diabetes Father           89 years    Hypertension Father           89 year    Obesity Father           89    Cancer Mother           85 years    Heart Disorder Mother           85 year    Cancer Maternal Grandfather     Cancer Paternal Grandfather     Breast Cancer Self 45        left      Social History:   Social History     Socioeconomic History    Marital status:    Tobacco Use    Smoking status: Never    Smokeless tobacco: Never    Tobacco comments:     No Never   Substance and Sexual Activity    Alcohol use: Yes     Alcohol/week: 0.8 standard drinks of alcohol     Types: 1 Standard drinks or equivalent per week     Comment: 1 Beer in two Months.    Drug use: Never   Other Topics Concern    Caffeine Concern Yes    Stress Concern No    Weight Concern Yes     Comment:  Im trying to Lose some weight with weight Watcher Carbs /Sug    Special Diet Yes    Exercise Yes    Seat Belt Yes    Reaction to local anesthetic No   Social History Narrative    ** Merged History Encounter **             Medications (Active prior to today's visit):  Current Outpatient Medications   Medication Sig Dispense Refill    triamcinolone 0.5 % External Cream USE TWICE A DAY AS DIRECTED TO PSORIASIS ON NECK, AROUND EARS AND FACE 60 g 1    alendronate 70 MG Oral Tab Take 1 tablet (70 mg total) by mouth every 7 days. 12 tablet 3    atorvastatin 10 MG Oral Tab Take 1 tablet (10 mg total) by mouth nightly. 90 tablet 3    metFORMIN 500 MG Oral Tab Take 1 tablet (500 mg total) by mouth 2 (two) times daily with meals. 180 tablet 1    clobetasol 0.05 % External Solution Use bid  As directed to scalp 50 mL 6    ketoconazole 2 % External Shampoo Apply to scalp 2 times per week. 120 mL 11       Allergies:  Allergies[1]    ROS:   CONSTITUTIONAL:  negative for fevers, rigors  EYES:  negative for diplopia   RESPIRATORY:  negative for severe shortness of breath  CARDIOVASCULAR:  negative for crushing sub-sternal chest pain  GASTROINTESTINAL:  see HPI  GENITOURINARY:  negative for dysuria or gross hematuria  INTEGUMENT/BREAST:  SKIN:  negative for jaundice   ALLERGIC/IMMUNOLOGIC:  negative for hay fever  ENDOCRINE:  negative for cold intolerance and heat intolerance  MUSCULOSKELETAL:  negative for joint effusion/severe erythema  BEHAVIOR/PSYCH:  negative for psychotic behavior      PHYSICAL EXAM:   Blood pressure 129/66, pulse 72, height 5' 5\" (1.651 m), weight 201 lb (91.2 kg).    Gen- Patient appears comfortable and in no acute discomfort  HEENT: the sclera appears anicteric, oropharynx clear, mucus membranes appear moist  CV- regular rate and rhythm, the extremities are warm and well perfused   Lung- Moves air well; No labored breathing  Abdomen- soft, non-tender exam in all quadrants without rigidity or guarding,  non-distended, no abnormal bowel sounds noted, no masses are palpated  Skin- No jaundice  Ext: no cyanosis, clubbing or edema is evident.   Neuro- Alert and interactive, and gross movements of extremities normal    Labs/Imaging:     Patient's labs and imaging were reviewed and discussed with patient today.    Recent Labs     06/17/22  1101   RBC 4.38   HGB 13.2   HCT 40.0   MCV 91.3   MCH 30.1   MCHC 33.0   RDW 12.8   NEPRELIM 3.50   WBC 6.0   .0     Lab Results   Component Value Date     (H) 06/01/2024    BUN 19 06/01/2024    BUNCREA 27.5 (H) 06/01/2024    CREATSERUM 0.69 06/01/2024    ANIONGAP 7 06/01/2024    GFRNAA 96 06/17/2022    GFRAA 110 06/17/2022    CA 10.1 11/02/2024    OSMOCALC 300 (H) 06/01/2024    ALKPHO 36 (L) 06/01/2024    AST 13 06/01/2024    ALT 12 06/01/2024    ALKPHOS 40 11/23/2015    BILT 0.5 06/01/2024    TP 7.3 06/01/2024    ALB 5.0 (H) 06/01/2024    GLOBULIN 2.3 06/01/2024    AGRATIO 1.8 06/11/2021     06/01/2024    K 4.0 06/01/2024     06/01/2024    CO2 27.0 06/01/2024     No results found for: \"PTP\", \"PT\", \"INR\"  .  ASSESSMENT/PLAN:   No anemia noted on lab work.    # Average Risk screening: patient is considered average risk for colon cancer (denies family hx of colon cancer) and it is appropriate to proceed with screening colonoscopy. Patient is currently asymptomatic and denies diarrhea, hematochezia, thin-stools or weight loss. We discussed risks/benefits/alternatives to procedure, including CT colonography and stool testing, they want to proceed with colonoscopy.    Recommend:  1. Schedule colonoscopy with MAC [Diagnosis: screening colonoscopy]    2.  bowel prep from pharmacy (split suprep or trilyte)    3. Continue all medications for procedure except  -Diabetes meds: Hold metformin or other oral hypoglycemic medication day of procedure and day before procedure. If patient is on other diabetic medications/insulin please ask primary or endocrine to  manage pre-procedure and day of procedure    ** If MAC @ [facility]/[place]:    - NO alcohol, recreational drugs nor erectile dysfunction mediations 72 hours before procedure(s)   - NO herbal supplements or weight loss medications x 7 days prior to the procedure(s)    ** If MAC @ [facility] or  twilit - continue all medications as prescribed      4. Read all bowel prep instructions carefully    5. AVOID seeds, nuts, popcorn, raw fruits and vegetables (cooked is okay) for 5 days before procedure      >>>Please note: if you were prescribed Suprep for the bowel prep and it is too expensive or not covered by insurance, it is okay to substitute Trilyte (or any similar generic prep). This can be done by notifying the pharmacy or calling our office.       Colonoscopy consent: I have discussed the risks (including risk of delayed/missed diagnosis), benefits, delayed/missed diagnosis, and alternatives to colonoscopy with the patient [who demonstrated understanding], including but not limited to the risks of bleeding, infection, pain, as well as the risks of anesthesia and perforation all leading to prolonged hospitalization or surgical intervention. I also specifically mentioned the miss rate of colonoscopy of 5-10% in the best of all circumstances.  It is the patient's responsibility to contact his/her insurance company regarding questions about out-of-pocket cost/benefits and was provided the appropriate diagnostic information/codes.  All questions were answered to the patient’s satisfaction. The patient signed informed consent and elected to proceed with colonoscopy with intervention [i.e. polypectomy, stent placement, etc.] as indicated.        Orders This Visit:  No orders of the defined types were placed in this encounter.      Meds This Visit:  Requested Prescriptions      No prescriptions requested or ordered in this encounter       Imaging & Referrals:  None         Ha Engel MD  1/29/2025         [1] No Known Allergies

## 2025-01-29 NOTE — PATIENT INSTRUCTIONS
# Average Risk screening: patient is considered average risk for colon cancer (denies family hx of colon cancer) and it is appropriate to proceed with screening colonoscopy. Patient is currently asymptomatic and denies diarrhea, hematochezia, thin-stools or weight loss. We discussed risks/benefits/alternatives to procedure, including CT colonography and stool testing, they want to proceed with colonoscopy.    Recommend:  1. Schedule colonoscopy with MAC [Diagnosis: screening colonoscopy]    2.  bowel prep from pharmacy (split suprep or trilyte)    3. Continue all medications for procedure except  -Diabetes meds: Hold metformin or other oral hypoglycemic medication day of procedure and day before procedure. If patient is on other diabetic medications/insulin please ask primary or endocrine to manage pre-procedure and day of procedure    ** If MAC @ [facility]/[place]:    - NO alcohol, recreational drugs nor erectile dysfunction mediations 72 hours before procedure(s)   - NO herbal supplements or weight loss medications x 7 days prior to the procedure(s)    ** If MAC @ [facility] or [facility] twilight - continue all medications as prescribed      4. Read all bowel prep instructions carefully    5. AVOID seeds, nuts, popcorn, raw fruits and vegetables (cooked is okay) for 5 days before procedure      >>>Please note: if you were prescribed Suprep for the bowel prep and it is too expensive or not covered by insurance, it is okay to substitute Trilyte (or any similar generic prep). This can be done by notifying the pharmacy or calling our office.

## 2025-03-08 ENCOUNTER — LAB ENCOUNTER (OUTPATIENT)
Dept: LAB | Age: 60
End: 2025-03-08
Attending: INTERNAL MEDICINE
Payer: COMMERCIAL

## 2025-03-08 ENCOUNTER — OFFICE VISIT (OUTPATIENT)
Age: 60
End: 2025-03-08
Payer: COMMERCIAL

## 2025-03-08 VITALS
WEIGHT: 206 LBS | OXYGEN SATURATION: 99 % | DIASTOLIC BLOOD PRESSURE: 78 MMHG | BODY MASS INDEX: 34.32 KG/M2 | HEIGHT: 65 IN | SYSTOLIC BLOOD PRESSURE: 128 MMHG | HEART RATE: 85 BPM

## 2025-03-08 DIAGNOSIS — Z12.31 BREAST CANCER SCREENING BY MAMMOGRAM: ICD-10-CM

## 2025-03-08 DIAGNOSIS — L40.9 PSORIASIS: ICD-10-CM

## 2025-03-08 DIAGNOSIS — E21.3 HYPERPARATHYROIDISM (HCC): ICD-10-CM

## 2025-03-08 DIAGNOSIS — E11.9 TYPE 2 DIABETES MELLITUS WITHOUT COMPLICATION, WITHOUT LONG-TERM CURRENT USE OF INSULIN (HCC): ICD-10-CM

## 2025-03-08 DIAGNOSIS — E11.9 TYPE 2 DIABETES MELLITUS WITHOUT COMPLICATION, WITHOUT LONG-TERM CURRENT USE OF INSULIN (HCC): Primary | ICD-10-CM

## 2025-03-08 LAB
ALBUMIN SERPL-MCNC: 5.1 G/DL (ref 3.2–4.8)
ALBUMIN/GLOB SERPL: 2 {RATIO} (ref 1–2)
ALP LIVER SERPL-CCNC: 34 U/L
ALT SERPL-CCNC: 15 U/L
ANION GAP SERPL CALC-SCNC: 8 MMOL/L (ref 0–18)
AST SERPL-CCNC: 19 U/L (ref ?–34)
BASOPHILS # BLD AUTO: 0.07 X10(3) UL (ref 0–0.2)
BASOPHILS NFR BLD AUTO: 1.1 %
BILIRUB SERPL-MCNC: 0.5 MG/DL (ref 0.2–1.1)
BUN BLD-MCNC: 13 MG/DL (ref 9–23)
BUN/CREAT SERPL: 14.1 (ref 10–20)
CALCIUM BLD-MCNC: 10.3 MG/DL (ref 8.7–10.4)
CHLORIDE SERPL-SCNC: 105 MMOL/L (ref 98–112)
CHOLEST SERPL-MCNC: 128 MG/DL (ref ?–200)
CO2 SERPL-SCNC: 25 MMOL/L (ref 21–32)
CREAT BLD-MCNC: 0.92 MG/DL
CREAT UR-SCNC: 185.7 MG/DL
DEPRECATED RDW RBC AUTO: 40.3 FL (ref 35.1–46.3)
EGFRCR SERPLBLD CKD-EPI 2021: 71 ML/MIN/1.73M2 (ref 60–?)
EOSINOPHIL # BLD AUTO: 0.04 X10(3) UL (ref 0–0.7)
EOSINOPHIL NFR BLD AUTO: 0.6 %
ERYTHROCYTE [DISTWIDTH] IN BLOOD BY AUTOMATED COUNT: 12.4 % (ref 11–15)
EST. AVERAGE GLUCOSE BLD GHB EST-MCNC: 137 MG/DL (ref 68–126)
FASTING PATIENT LIPID ANSWER: YES
FASTING STATUS PATIENT QL REPORTED: YES
GLOBULIN PLAS-MCNC: 2.6 G/DL (ref 2–3.5)
GLUCOSE BLD-MCNC: 135 MG/DL (ref 70–99)
HBA1C MFR BLD: 6.4 % (ref ?–5.7)
HCT VFR BLD AUTO: 42.9 %
HDLC SERPL-MCNC: 40 MG/DL (ref 40–59)
HGB BLD-MCNC: 14.7 G/DL
IMM GRANULOCYTES # BLD AUTO: 0.03 X10(3) UL (ref 0–1)
IMM GRANULOCYTES NFR BLD: 0.5 %
LDLC SERPL CALC-MCNC: 68 MG/DL (ref ?–100)
LYMPHOCYTES # BLD AUTO: 2.06 X10(3) UL (ref 1–4)
LYMPHOCYTES NFR BLD AUTO: 31.6 %
MCH RBC QN AUTO: 30.6 PG (ref 26–34)
MCHC RBC AUTO-ENTMCNC: 34.3 G/DL (ref 31–37)
MCV RBC AUTO: 89.4 FL
MICROALBUMIN UR-MCNC: 0.7 MG/DL
MICROALBUMIN/CREAT 24H UR-RTO: 3.8 UG/MG (ref ?–30)
MONOCYTES # BLD AUTO: 0.45 X10(3) UL (ref 0.1–1)
MONOCYTES NFR BLD AUTO: 6.9 %
NEUTROPHILS # BLD AUTO: 3.86 X10 (3) UL (ref 1.5–7.7)
NEUTROPHILS # BLD AUTO: 3.86 X10(3) UL (ref 1.5–7.7)
NEUTROPHILS NFR BLD AUTO: 59.3 %
NONHDLC SERPL-MCNC: 88 MG/DL (ref ?–130)
OSMOLALITY SERPL CALC.SUM OF ELEC: 288 MOSM/KG (ref 275–295)
PLATELET # BLD AUTO: 257 10(3)UL (ref 150–450)
POTASSIUM SERPL-SCNC: 4.8 MMOL/L (ref 3.5–5.1)
PROT SERPL-MCNC: 7.7 G/DL (ref 5.7–8.2)
RBC # BLD AUTO: 4.8 X10(6)UL
SODIUM SERPL-SCNC: 138 MMOL/L (ref 136–145)
TRIGL SERPL-MCNC: 107 MG/DL (ref 30–149)
VLDLC SERPL CALC-MCNC: 16 MG/DL (ref 0–30)
WBC # BLD AUTO: 6.5 X10(3) UL (ref 4–11)

## 2025-03-08 PROCEDURE — 36415 COLL VENOUS BLD VENIPUNCTURE: CPT

## 2025-03-08 PROCEDURE — 85025 COMPLETE CBC W/AUTO DIFF WBC: CPT

## 2025-03-08 PROCEDURE — 99214 OFFICE O/P EST MOD 30 MIN: CPT | Performed by: INTERNAL MEDICINE

## 2025-03-08 PROCEDURE — 80053 COMPREHEN METABOLIC PANEL: CPT

## 2025-03-08 PROCEDURE — 82043 UR ALBUMIN QUANTITATIVE: CPT

## 2025-03-08 PROCEDURE — 83036 HEMOGLOBIN GLYCOSYLATED A1C: CPT

## 2025-03-08 PROCEDURE — 80061 LIPID PANEL: CPT

## 2025-03-08 PROCEDURE — 82570 ASSAY OF URINE CREATININE: CPT

## 2025-03-08 NOTE — PROGRESS NOTES
Chief complaint and HPI      Mary Tovar  59 year old female   Chief Complaint   Patient presents with    Follow - Up     Chief Complaint: DM, history of hepatitis C, hyperparathyroidism, psoriasis      Discussed diabetes management ;     following  healthy   diet   not checking sugars   compliant , no medication side effects   no polyuria or polydipsia  no numbness, tingling or pain in extremities  no unusual visual symptoms  weight stable    Lab Results   Component Value Date     (H) 06/01/2024    BUN 19 06/01/2024    CREATSERUM 0.69 06/01/2024    BUNCREA 27.5 (H) 06/01/2024    ANIONGAP 7 06/01/2024    GFRAA 110 06/17/2022    GFRNAA 96 06/17/2022    CA 10.1 11/02/2024     06/01/2024    K 4.0 06/01/2024     06/01/2024    CO2 27.0 06/01/2024    OSMOCALC 300 (H) 06/01/2024       Lab Results   Component Value Date    MALBP 0.70 06/01/2024    CREUR 144.70 06/01/2024    MICROALBUMIN 0.4 06/22/2021    CREAUR 80 06/22/2021    MALBCREACALC 5 06/22/2021       Lab Results   Component Value Date    CHOLEST 120 06/01/2024    TRIG 88 06/01/2024    HDL 42 06/01/2024    LDL 61 06/01/2024    VLDL 13 06/01/2024    TCHDLRATIO 4.3 06/11/2021    NONHDLC 78 06/01/2024     Blood Sugar Medications            metFORMIN 500 MG Oral Tab               Discussed blood pressure readings -  ,lifestyle -currently no medications.  No symptoms     BP Readings from Last 3 Encounters:   03/08/25 128/78   01/29/25 129/66   11/02/24 102/68          Discussed lipid control for cardiac prevention.  Lab Results   Component Value Date    CHOLEST 120 06/01/2024    TRIG 88 06/01/2024    HDL 42 06/01/2024    LDL 61 06/01/2024    VLDL 13 06/01/2024    TCHDLRATIO 4.3 06/11/2021    NONHDLC 78 06/01/2024     Cholesterol Lowering Medications            atorvastatin 10 MG Oral Tab          Has no hyperparathyroid concerns such as kidney stones bone pain or significant hypercalcemia.  Most recent labs on 3 8 showed calcium level normal.   Last PTH in February was 167.      Has history of hepatitis C has no signs of cirrhosis on imaging.  Reviewed the literature does not need every 6 month ultrasounds or alpha-fetoprotein.  EXAM:     Vitals:    03/08/25 1050   BP: 128/78   Pulse: 85   VITALSBody mass index is 34.28 kg/m².      She looks healthy.  Has a very pleasant personality.    Neuro ; normal     Neck ; no sig findings    Heart exam; regular     Lungs; no rhonchi or rales     Ext ; no edema     Bilateral barefoote appearance is normal.  Bilateral monofilament/sensation of both feet is normal.  Pedal pulse exam of both lower feet is normal.      Mary was seen today for follow - up.    Diagnoses and all orders for this visit:    Type 2 diabetes mellitus without complication, without long-term current use of insulin (HCC)-her last A1c was 6.0 recommended continue metformin.  Follow-up with ophthalmology on a regular basis currently has no complications.  Is on statin for guideline reasons.  -     Hemoglobin A1C; Future  -     Microalb/Creat Ratio, Random Urine; Future  -     Comp Metabolic Panel (14); Future  -     Lipid Panel; Future  -     CBC With Differential With Platelet; Future    Breast cancer screening by mammogram-ordered for the future  -     [facility] TORSTEN 2D+3D SCREENING BILAT (CPT=77067/47133); Future    Hyperparathyroidism (HCC)-calcium has been stable.  Is on Fosamax.    Psoriasis-is on ketoconazole and clobetasol.           Diabetes currently at goal   continue present meds  discussed dietary compliance and exercise  discussed  diabetic complications         This note was prepared using Dragon Medical voice recognition dictation software and as a result, errors may occur. When identified, these errors have been corrected. While every attempt is made to correct errors during dictation, discrepancies may still exist       Ronak Mcbride MD

## 2025-05-05 ENCOUNTER — TELEPHONE (OUTPATIENT)
Facility: CLINIC | Age: 60
End: 2025-05-05

## 2025-05-06 PROBLEM — K63.5 POLYP OF COLON: Status: ACTIVE | Noted: 2025-05-06

## 2025-05-06 PROBLEM — Z98.890 HISTORY OF COLON SURGERY: Status: ACTIVE | Noted: 2025-05-06

## 2025-05-06 PROCEDURE — 88305 TISSUE EXAM BY PATHOLOGIST: CPT | Performed by: INTERNAL MEDICINE

## 2025-05-08 ENCOUNTER — TELEPHONE (OUTPATIENT)
Facility: CLINIC | Age: 60
End: 2025-05-08

## 2025-05-08 NOTE — TELEPHONE ENCOUNTER
----- Message from Ha Engel sent at 5/8/2025  9:45 AM CDT -----  GI staff: please place recall for colonoscopy in 5 years     ----- Message -----  From: Lab, Background User  Sent: 5/7/2025   2:39 PM CDT  To: Ha Engel MD

## 2025-05-08 NOTE — TELEPHONE ENCOUNTER
Recall colonoscopy in 5 years per Dr. Engel. Colonoscopy done on 5/6/25.    Health maintenance updated and message sent to pt outreach to repeat colon in 5 years.    [employer] message sent by MD read by pt on 5/8/25.

## 2025-07-12 ENCOUNTER — HOSPITAL ENCOUNTER (OUTPATIENT)
Dept: MAMMOGRAPHY | Age: 60
Discharge: HOME | End: 2025-07-12
Attending: INTERNAL MEDICINE
Payer: COMMERCIAL

## 2025-07-12 DIAGNOSIS — Z12.31 BREAST CANCER SCREENING BY MAMMOGRAM: ICD-10-CM

## 2025-07-12 PROCEDURE — 77067 SCR MAMMO BI INCL CAD: CPT | Performed by: INTERNAL MEDICINE

## 2025-07-12 PROCEDURE — 77063 BREAST TOMOSYNTHESIS BI: CPT | Performed by: INTERNAL MEDICINE

## 2025-07-21 ENCOUNTER — RESULTS FOLLOW-UP (OUTPATIENT)
Age: 60
End: 2025-07-21

## 2025-08-09 ENCOUNTER — OFFICE VISIT (OUTPATIENT)
Age: 60
End: 2025-08-09

## 2025-08-09 VITALS
DIASTOLIC BLOOD PRESSURE: 80 MMHG | SYSTOLIC BLOOD PRESSURE: 122 MMHG | HEART RATE: 79 BPM | HEIGHT: 64 IN | BODY MASS INDEX: 36.54 KG/M2 | OXYGEN SATURATION: 98 % | WEIGHT: 214 LBS

## 2025-08-09 DIAGNOSIS — E21.3 HYPERPARATHYROIDISM (HCC): ICD-10-CM

## 2025-08-09 DIAGNOSIS — Z87.2 HISTORY OF PSORIASIS: Primary | ICD-10-CM

## 2025-08-09 DIAGNOSIS — E78.5 DYSLIPIDEMIA: ICD-10-CM

## 2025-08-09 DIAGNOSIS — E11.9 TYPE 2 DIABETES MELLITUS WITHOUT COMPLICATION, WITHOUT LONG-TERM CURRENT USE OF INSULIN (HCC): ICD-10-CM

## 2025-08-09 PROCEDURE — 99214 OFFICE O/P EST MOD 30 MIN: CPT | Performed by: INTERNAL MEDICINE

## (undated) NOTE — Clinical Note
May 11, 2017    [address] [place] [address]      Dear Naman Lr: The results from your recent tests ordered by Dr. Roseanna Su  Gastroenterology, [address] [place] [address]. Result showed not significant abnormalities.   Please

## (undated) NOTE — Clinical Note
Thank you for the consult. I saw Ms Stella Trujillo in the endocrine/diabetes clinic today. Please see attached my note. Please feel free to contact me with any questions. Thanks!